# Patient Record
Sex: FEMALE | Race: WHITE | NOT HISPANIC OR LATINO | Employment: UNEMPLOYED | URBAN - METROPOLITAN AREA
[De-identification: names, ages, dates, MRNs, and addresses within clinical notes are randomized per-mention and may not be internally consistent; named-entity substitution may affect disease eponyms.]

---

## 2023-12-11 ENCOUNTER — MEDICAL CORRESPONDENCE (OUTPATIENT)
Dept: HEALTH INFORMATION MANAGEMENT | Facility: CLINIC | Age: 23
End: 2023-12-11

## 2024-02-29 ENCOUNTER — TRANSCRIBE ORDERS (OUTPATIENT)
Dept: OTHER | Age: 24
End: 2024-02-29

## 2024-02-29 DIAGNOSIS — J30.9 ALLERGIC RHINITIS, UNSPECIFIED SEASONALITY, UNSPECIFIED TRIGGER: Primary | ICD-10-CM

## 2024-03-01 ENCOUNTER — TELEPHONE (OUTPATIENT)
Dept: ALLERGY | Facility: CLINIC | Age: 24
End: 2024-03-01
Payer: COMMERCIAL

## 2024-03-01 NOTE — TELEPHONE ENCOUNTER
[facility] Call Center    Phone Message    May a detailed message be left on voicemail: no     Reason for Call: Appointment Intake    Referring Provider Name: Adriana Cooper APRN, CNP @ [facility]  Diagnosis and/or Symptoms: Allergic rhinitis, unspecified seasonality, unspecified trigger   Clinical Question: Several allergies. Unable to smell x 5 years. Please complete allergy testing and see if there are any options to improve sense of smell.   Scheduled: 7/9/2024 with Abner Bunch MD    Unable to stop taking allergy medications 7 days prior to the appointment. Routing to clinic per protocols.    Action Taken: Message routed to:  Other: [facility] Allergy    Travel Screening: Not Applicable

## 2024-03-08 NOTE — TELEPHONE ENCOUNTER
Called  services and left a voicemail. Patient does not have to stop taking antihistamines prior to her visit.  If allergy testing is needed, blood tests can be preformed instead of the skin testing.     Quentin Oleary MA  3/8/2024 12:41 PM

## 2024-03-09 ENCOUNTER — MEDICAL CORRESPONDENCE (OUTPATIENT)
Dept: HEALTH INFORMATION MANAGEMENT | Facility: CLINIC | Age: 24
End: 2024-03-09
Payer: COMMERCIAL

## 2024-03-12 ENCOUNTER — TRANSCRIBE ORDERS (OUTPATIENT)
Dept: OTHER | Age: 24
End: 2024-03-12

## 2024-03-12 DIAGNOSIS — R43.0 ANOSMIA: Primary | ICD-10-CM

## 2024-03-19 NOTE — TELEPHONE ENCOUNTER
"FUTURE VISIT INFORMATION      FUTURE VISIT INFORMATION:  Date: 6/11/24  Time: 11 AM  Location: [facility]  REFERRAL INFORMATION:  Referring provider:  Ben Arellano MD  Referring providers clinic:  [facility]  Reason for visit/diagnosis:  Anosmia [R43.0]  REF BY Ben Arellano MD - [facility]  RECS IN EPIC  CONF LOC  SCHED W/PT AND      RECORDS REQUESTED FROM      Clinic name Comments Records Status Imaging Status   [facility] 3/9/24 referral/ note- Ben Arellano MD  12/11/23 note- Adriana Cooper, NAZ,CNP  CE            \"Please notify/message CSS if patient completed outside imaging prior to scheduled appointment and/or any outside records that might have been missed at pre visit -Thank you\"  "

## 2024-06-10 PROBLEM — R43.8 REDUCED SENSE OF SMELL: Status: ACTIVE | Noted: 2023-07-10

## 2024-06-11 ENCOUNTER — OFFICE VISIT (OUTPATIENT)
Dept: OTOLARYNGOLOGY | Facility: CLINIC | Age: 24
End: 2024-06-11
Attending: INTERNAL MEDICINE
Payer: COMMERCIAL

## 2024-06-11 ENCOUNTER — PRE VISIT (OUTPATIENT)
Dept: OTOLARYNGOLOGY | Facility: CLINIC | Age: 24
End: 2024-06-11

## 2024-06-11 VITALS — SYSTOLIC BLOOD PRESSURE: 131 MMHG | HEART RATE: 102 BPM | OXYGEN SATURATION: 96 % | DIASTOLIC BLOOD PRESSURE: 88 MMHG

## 2024-06-11 DIAGNOSIS — J32.4 CHRONIC PANSINUSITIS: ICD-10-CM

## 2024-06-11 DIAGNOSIS — J33.9 NASAL POLYP: ICD-10-CM

## 2024-06-11 DIAGNOSIS — R43.0 ANOSMIA: Primary | ICD-10-CM

## 2024-06-11 PROCEDURE — 99204 OFFICE O/P NEW MOD 45 MIN: CPT | Mod: 25 | Performed by: STUDENT IN AN ORGANIZED HEALTH CARE EDUCATION/TRAINING PROGRAM

## 2024-06-11 PROCEDURE — 31231 NASAL ENDOSCOPY DX: CPT | Performed by: STUDENT IN AN ORGANIZED HEALTH CARE EDUCATION/TRAINING PROGRAM

## 2024-06-11 RX ORDER — PREDNISONE 10 MG/1
TABLET ORAL
Qty: 30 TABLET | Refills: 0 | Status: SHIPPED | OUTPATIENT
Start: 2024-06-11 | End: 2024-07-18

## 2024-06-11 RX ORDER — FLUTICASONE PROPIONATE 50 MCG
2 SPRAY, SUSPENSION (ML) NASAL 2 TIMES DAILY
Qty: 11.1 ML | Refills: 3 | Status: SHIPPED | OUTPATIENT
Start: 2024-06-11

## 2024-06-11 NOTE — LETTER
6/11/2024       RE: Yanely Fiore  [address] [address]     Dear Colleague,    Thank you for referring your patient, Yanely Fiore, to the [facility] [place] at [facility]. Please see a copy of my visit note below.      [facility] - Rhinology  New Patient Visit      Encounter date:   June 11, 2024    Referring Provider:  Ben Arellano MD  [facility]  [address] [place] [address]    Assessment, Decision Making, and Plan:  (R43.0) Anosmia  (primary encounter diagnosis)  (J32.4) Chronic pansinusitis  (J33.9) Nasal polyp  Comment:   --we discussed that chronic sinusitis (CRS) is a chronic inflammatory condition that affects the nose and sinuses   --we discussed that we manage CRS using a combination of systemic antibiotic and steroid therapy, topical steroid and antibiotics, endoscopic sinus surgery, and occasionally systemic biologic therapy  --we discussed that the goal of therapy is for symptom control  --grade 1-2 polyps bilaterally, contained to MM  Plan: IMAGESTREAM RECORDING ORDER, predniSONE         (DELTASONE) 10 MG tablet, fluticasone (FLONASE)        50 MCG/ACT nasal spray  --pred taper and flonase  --Follow up 1 month  --Possible CT at that point and symptom check    Chief Complaint: smell loss    History of Present Illness:   Yanely Fiore is a 23 year old female who presents for consultation regarding smell loss.    Smell loss for 5 years  UPSIT today 7/40    At the time of onset, had runny nose, itching, sneezing, difficulty breathing through the nose  Taste is preserved    Still has congestion, runny nose, sneezing symptoms  Year round  Nasal sprays helped  No nasal surgery      Review of systems: A 14-point review of systems has been conducted and was negative for any notable symptoms, except as dictated in the history of present illness.     Medical  History:  No past medical history on file.     Surgical History:   No past surgical history on file.     Family History:  No family history on file.     Social History:   Social History     Socioeconomic History    Marital status: Single     Social Determinants of Health     Financial Resource Strain: Not on File (2023)    Received from [facility]     Financial Resource Strain     Financial Resource Strain: 0   Food Insecurity: Not on File (2023)    Received from [facility]     Food Insecurity     Food: 0   Transportation Needs: Not on File (2023)    Received from [facility]     Transportation Needs     Transportation: 0   Physical Activity: Not on File (2023)    Received from [facility]     Physical Activity     Physical Activity: 0   Stress: Not on File (2023)    Received from [facility]     Stress     Stress: 0   Social Connections: Not on File (2023)    Received from [facility]     Social Connections     Social Connections and Isolation: 0   Housing Stability: Not on File (2023)    Received from [facility]     Housing Stability     Housin        Physical Exam:  Vital signs: /88 (BP Location: Left arm, Patient Position: Sitting, Cuff Size: Adult Regular)   Pulse 102   SpO2 96%    General Appearance: No acute distress, appropriate demeanor, conversant  Eyes: moist conjunctivae; EOMI; pupils symmetric; visual acuity grossly intact; no proptosis  Head: normocephalic; overall symmetric appearance without deformity  Face: overall symmetric without deformity  Ears: Normal appearance of external ear; external meatus normal in appearance; TMs intact without perforation bilaterally;   Nose: No external deformity; septum (midline) ; inferior turbinates (pale blue)   Oral Cavity/oropharynx: Normal appearance of mucosa; tongue midline; no mass or lesions; oropharynx without obvious mucosal abnormality  Neck: no palpable lymphadenopathy; thyroid  without palpable nodules  Lungs: symmetric chest rise; no wheezing  CV: Good distal perfusion; normal hear rate  Extremities: No deformity  Neurologic Exam: Cranial nerves II-XII are grossly intact; no focal deficit    Procedure Note  Procedure performed: Rigid nasal endoscopy  Indication: To evaluate for sinonasal pathology not visualized on routine anterior rhinoscopy  Anesthesia: 4% topical lidocaine with 0.05% oxymetazoline  Description of procedure: A 30 degree, 3 mm rigid endoscope was inserted into bilateral nasal cavities and the nasal valves, nasal cavity, middle meatus, sphenoethmoid recess, and nasopharynx were thoroughly evaluated for evidence of obstruction, edema, purulence, polyps and/or mass/lesion.     Findings:    Bilateral polyps contained to MM  NP clear, no significant septal deviation    The patient tolerated the procedure well without complication.       Steffen Alonso MD    [facility]  Rhinology  Endoscopic Skull Base Surgery  [facility]  Department of Otolaryngology - Head & Neck Surgery

## 2024-06-11 NOTE — PATIENT INSTRUCTIONS
You were seen in the ENT Clinic today by Dr. Alonso. If you have any questions or concerns after your appointment, please contact us (see below)    The following has been recommended for you based upon your appointment today:  Flonase and Prednisone have been sent to your pharmacy     Please return to clinic in 1 month for follow up with Dr. Alonso     How to Contact Us:  Send a [employer]om[employer] message to your provider. Our team will respond to you via Neom[employer]. Occasionally, we will need to call you to get further information.  For urgent matters (Monday-Friday), call the ENT Clinic: [phone] and speak with a call center team member - they will route your call appropriately.   If you'd like to speak directly with a nurse, please find our contact information below. We do our best to check voicemail frequently throughout the day, and will work to call you back within 1-2 days. For urgent matters, please use the general clinic phone numbers listed above.    Kamille PRUITT RN, BSN   RN Care Coordinator, ENT Clinic  [facility]  Direct: [phone]  Shannan ANDERSON LPN  Direct: [phone]

## 2024-06-11 NOTE — PROGRESS NOTES
[facility] - Rhinology  New Patient Visit      Encounter date:   June 11, 2024    Referring Provider:  Ben Arellano MD  [facility]  [address] [place] [address]    Assessment, Decision Making, and Plan:  (R43.0) Anosmia  (primary encounter diagnosis)  (J32.4) Chronic pansinusitis  (J33.9) Nasal polyp  Comment:   --we discussed that chronic sinusitis (CRS) is a chronic inflammatory condition that affects the nose and sinuses   --we discussed that we manage CRS using a combination of systemic antibiotic and steroid therapy, topical steroid and antibiotics, endoscopic sinus surgery, and occasionally systemic biologic therapy  --we discussed that the goal of therapy is for symptom control  --grade 1-2 polyps bilaterally, contained to MM  Plan: IMAGESTREAM RECORDING ORDER, predniSONE         (DELTASONE) 10 MG tablet, fluticasone (FLONASE)        50 MCG/ACT nasal spray  --pred taper and flonase  --Follow up 1 month  --Possible CT at that point and symptom check    Chief Complaint: smell loss    History of Present Illness:   Yanely Fiore is a 23 year old female who presents for consultation regarding smell loss.    Smell loss for 5 years  UPSIT today 7/40    At the time of onset, had runny nose, itching, sneezing, difficulty breathing through the nose  Taste is preserved    Still has congestion, runny nose, sneezing symptoms  Year round  Nasal sprays helped  No nasal surgery      Review of systems: A 14-point review of systems has been conducted and was negative for any notable symptoms, except as dictated in the history of present illness.     Medical History:  No past medical history on file.     Surgical History:   No past surgical history on file.     Family History:  No family history on file.     Social History:   Social History     Socioeconomic History    Marital status: Single     Social Determinants of Health     Financial Resource Strain: Not on File (11/28/2023)     Received from [facility]     Financial Resource Strain     Financial Resource Strain: 0   Food Insecurity: Not on File (2023)    Received from [facility]     Food Insecurity     Food: 0   Transportation Needs: Not on File (2023)    Received from [facility]     Transportation Needs     Transportation: 0   Physical Activity: Not on File (2023)    Received from [facility]     Physical Activity     Physical Activity: 0   Stress: Not on File (2023)    Received from [facility]     Stress     Stress: 0   Social Connections: Not on File (2023)    Received from [facility]     Social Connections     Social Connections and Isolation: 0   Housing Stability: Not on File (2023)    Received from [facility]     Housing Stability     Housin        Physical Exam:  Vital signs: /88 (BP Location: Left arm, Patient Position: Sitting, Cuff Size: Adult Regular)   Pulse 102   SpO2 96%    General Appearance: No acute distress, appropriate demeanor, conversant  Eyes: moist conjunctivae; EOMI; pupils symmetric; visual acuity grossly intact; no proptosis  Head: normocephalic; overall symmetric appearance without deformity  Face: overall symmetric without deformity  Ears: Normal appearance of external ear; external meatus normal in appearance; TMs intact without perforation bilaterally;   Nose: No external deformity; septum (midline) ; inferior turbinates (pale blue)   Oral Cavity/oropharynx: Normal appearance of mucosa; tongue midline; no mass or lesions; oropharynx without obvious mucosal abnormality  Neck: no palpable lymphadenopathy; thyroid without palpable nodules  Lungs: symmetric chest rise; no wheezing  CV: Good distal perfusion; normal hear rate  Extremities: No deformity  Neurologic Exam: Cranial nerves II-XII are grossly intact; no focal deficit    Procedure Note  Procedure performed: Rigid nasal endoscopy  Indication: To evaluate for sinonasal pathology not  visualized on routine anterior rhinoscopy  Anesthesia: 4% topical lidocaine with 0.05% oxymetazoline  Description of procedure: A 30 degree, 3 mm rigid endoscope was inserted into bilateral nasal cavities and the nasal valves, nasal cavity, middle meatus, sphenoethmoid recess, and nasopharynx were thoroughly evaluated for evidence of obstruction, edema, purulence, polyps and/or mass/lesion.     Findings:    Bilateral polyps contained to MM  NP clear, no significant septal deviation    The patient tolerated the procedure well without complication.       Steffen Alonso MD    [facility]  Rhinology  Endoscopic Skull Base Surgery  [facility]  Department of Otolaryngology - Head & Neck Surgery

## 2024-07-09 ENCOUNTER — OFFICE VISIT (OUTPATIENT)
Dept: ALLERGY | Facility: CLINIC | Age: 24
End: 2024-07-09
Payer: COMMERCIAL

## 2024-07-09 VITALS
SYSTOLIC BLOOD PRESSURE: 105 MMHG | WEIGHT: 106.8 LBS | DIASTOLIC BLOOD PRESSURE: 73 MMHG | OXYGEN SATURATION: 97 % | HEART RATE: 91 BPM

## 2024-07-09 DIAGNOSIS — R09.89 RUNNY NOSE: ICD-10-CM

## 2024-07-09 DIAGNOSIS — R43.8 REDUCED SENSE OF SMELL: Primary | ICD-10-CM

## 2024-07-09 DIAGNOSIS — R09.81 NASAL CONGESTION: ICD-10-CM

## 2024-07-09 DIAGNOSIS — J33.9 NASAL POLYP: ICD-10-CM

## 2024-07-09 DIAGNOSIS — T78.1XXA ADVERSE FOOD REACTION, INITIAL ENCOUNTER: ICD-10-CM

## 2024-07-09 DIAGNOSIS — J30.9 ALLERGIC RHINITIS, UNSPECIFIED SEASONALITY, UNSPECIFIED TRIGGER: ICD-10-CM

## 2024-07-09 PROCEDURE — 99204 OFFICE O/P NEW MOD 45 MIN: CPT | Performed by: INTERNAL MEDICINE

## 2024-07-09 NOTE — PATIENT INSTRUCTIONS
Allergy Staff Appt Hours Shot Hours Location       Physician   Abner Bunch MD      Support Staff   JUNE Greer RN, MA Emily J., MA      Mondays Tuesdays Thursdays and Fridays:      Christine 7-5      Wednesdays         Close                Mondays, Tuesdays and Fridays:  7:20 - 3:40              [employer]  [address]  [place] [address]  Allergy appointment  line: [phone]    Pulmonary Function Scheduling:  [place]: [phone]           Questions about cost of your care  For questions about your cost of your visit, procedure, lab or imaging contact: [employer] Line [phone] or visit:  [email or web address]    Prescription Assistance  If you need assistance with your prescriptions (cost, coverage, etc) please contact: [employer] Prescription Assistance Program [phone]    Important Scheduling Information  All visits for food challenges, medication/drug allergy testing, and drug challenges MUST be scheduled through the allergy clinic nurse. Please contact them via [employer] or by calling the clinic at [phone] and asking to speak with an allergy nurse. They will provide additional information and instructions for the appointment. Discontinue oral antihistamines 7 days prior to the appointment. Discontinue nasal and ocular antihistamines 1 day prior to the appointment.    Appointments for skin testing: Appointment will last approximately 45 minutes.  Please call the appointment line for your clinic to schedule.  Discontinue oral antihistamines 7 days prior to the appointment.  Discontinue nasal and ocular antihistamines 1 days prior to appointment.    Thank you for trusting us with your care. Please feel free to contact us with any questions or concerns you may have.

## 2024-07-09 NOTE — LETTER
7/9/2024      Yanely Fiore  [address] [address]      Dear Colleague,    Thank you for referring your patient, Yanely Fiore, to the [facility] SPECIALTY [facility]. Please see a copy of my visit note below.    Yanely Fiore was seen in the Allergy Clinic at [facility].    Yanely Fiore is a 24 year old female being seen today at the request of Adriana Cooper APRN [facility] in consultation for allergy concerns as well as decreased sense of smell.    She lost her sense of smell 5 to 10 years ago.  She saw an ENT provider on 6/11 and did have evidence of nasal polyps.  Prednisone was prescribed and Flonase was recommended.  The prednisone did help her sense of smell while she was taking it but then after stopping steroids her sense of smell has now worsened again.  In addition she has rhinorrhea, itchy nose and throat discomfort.  She also has some nasal congestion and difficulty breathing through her nose.  Symptoms are worse in the summer but she has symptoms year-round.    In addition she talks about nasal itching and lip itching with dry fruits and potentially some nuts.  However she eats all forms of nuts without any hives or significant reactions.    Melon causes itching of her mouth also.    A  was used for obtaining all medical information.      History reviewed. No pertinent past medical history.  History reviewed. No pertinent family history.  No past surgical history on file.    ENVIRONMENTAL HISTORY:   Pets inside the house include None.  Do you smoke cigarettes or other recreational drugs? No There is/are 0 smokers living in the house. The house does not have a damp basement.     SOCIAL HISTORY:   Yanely is unemployed. She lives with her family.      Review of Systems      Current Outpatient Medications:      fluticasone (FLONASE) 50 MCG/ACT nasal spray, Spray 2 sprays into both nostrils 2 times daily, Disp: 11.1 mL, Rfl:  3     predniSONE (DELTASONE) 10 MG tablet, Take 4 pills once daily for 3 days; then take 3 pills daily for 3 days; then take 2 pills daily for 3 days; then take 1 pill daily for 3 days. (Patient not taking: Reported on 7/9/2024), Disp: 30 tablet, Rfl: 0  Allergies   Allergen Reactions     Ibuprofen Itching, Other (See Comments) and Swelling     Fever/cold sores     Nuts      Other Food Allergy      Perfume          EXAM:   /73   Pulse 91   Wt 48.4 kg (106 lb 12.8 oz)   SpO2 97%     Physical Exam    Constitutional:       General: She is not in acute distress.     Appearance: Normal appearance. She is not ill-appearing.   HENT:      Head: Normocephalic and atraumatic.      Nose: Nasal turbinate hypertrophy on the right.     Mouth/Throat:      Mouth: Mucous membranes are moist.      Pharynx: Oropharynx is clear. No posterior oropharyngeal erythema.   Eyes:      General:         Right eye: No discharge.         Left eye: No discharge.   Cardiovascular:      Rate and Rhythm: Normal rate and regular rhythm.      Heart sounds: Normal heart sounds.   Pulmonary:      Effort: Pulmonary effort is normal.      Breath sounds: Normal breath sounds. No wheezing or rhonchi.   Skin:     General: Skin is warm.      Findings: No erythema or rash.   Neurological:      General: No focal deficit present.      Mental Status: She is alert. Mental status is at baseline.   Psychiatric:         Mood and Affect: Mood normal.         Behavior: Behavior normal.          ASSESSMENT/PLAN:  Yanely Fiore is a 24 year old female seen today for increased sense of smell as well as rhinorrhea, itchy nose and nasal congestion.  She also has a history of nasal polyps and did see ENT.  She also had food concerns with nuts, melons and dried fruit.  This sounds like oral allergy syndrome and we will evaluate with blood testing.    She will be contacted via phone with the results once they are are available.  She will continue with the Flonase.   She will continue evaluation with ENT as the loss of smell is one of her biggest complaints and she does have nasal polyps which may be contributing.  May consider allergy immunotherapy if she has significant allergies and is not responding to medications.  Based on her history I do not feel that she needs EpiPen.    Follow-up to be determined      Thank you for allowing me to participate in the care of Yanely Fiore.      I spent 50 minutes on the date of the encounter doing chart review, history and exam, documentation and further coordination as noted above exclusive of separately reported interpretations    Abner Bunch MD  Allergy/Immunology  [facility]      Again, thank you for allowing me to participate in the care of your patient.        Sincerely,        Abner Bunch MD

## 2024-07-09 NOTE — PROGRESS NOTES
Yanely Fiore was seen in the Allergy Clinic at [facility].    Yanely Fiore is a 24 year old female being seen today at the request of Adriana Cooper APRN [facility] in consultation for allergy concerns as well as decreased sense of smell.    She lost her sense of smell 5 to 10 years ago.  She saw an ENT provider on 6/11 and did have evidence of nasal polyps.  Prednisone was prescribed and Flonase was recommended.  The prednisone did help her sense of smell while she was taking it but then after stopping steroids her sense of smell has now worsened again.  In addition she has rhinorrhea, itchy nose and throat discomfort.  She also has some nasal congestion and difficulty breathing through her nose.  Symptoms are worse in the summer but she has symptoms year-round.    In addition she talks about nasal itching and lip itching with dry fruits and potentially some nuts.  However she eats all forms of nuts without any hives or significant reactions.    Melon causes itching of her mouth also.    A  was used for obtaining all medical information.      History reviewed. No pertinent past medical history.  History reviewed. No pertinent family history.  No past surgical history on file.    ENVIRONMENTAL HISTORY:   Pets inside the house include None.  Do you smoke cigarettes or other recreational drugs? No There is/are 0 smokers living in the house. The house does not have a damp basement.     SOCIAL HISTORY:   Yanely is unemployed. She lives with her family.      Review of Systems      Current Outpatient Medications:     fluticasone (FLONASE) 50 MCG/ACT nasal spray, Spray 2 sprays into both nostrils 2 times daily, Disp: 11.1 mL, Rfl: 3    predniSONE (DELTASONE) 10 MG tablet, Take 4 pills once daily for 3 days; then take 3 pills daily for 3 days; then take 2 pills daily for 3 days; then take 1 pill daily for 3 days. (Patient not taking: Reported on 7/9/2024), Disp: 30  tablet, Rfl: 0  Allergies   Allergen Reactions    Ibuprofen Itching, Other (See Comments) and Swelling     Fever/cold sores    Nuts     Other Food Allergy     Perfume          EXAM:   /73   Pulse 91   Wt 48.4 kg (106 lb 12.8 oz)   SpO2 97%     Physical Exam    Constitutional:       General: She is not in acute distress.     Appearance: Normal appearance. She is not ill-appearing.   HENT:      Head: Normocephalic and atraumatic.      Nose: Nasal turbinate hypertrophy on the right.     Mouth/Throat:      Mouth: Mucous membranes are moist.      Pharynx: Oropharynx is clear. No posterior oropharyngeal erythema.   Eyes:      General:         Right eye: No discharge.         Left eye: No discharge.   Cardiovascular:      Rate and Rhythm: Normal rate and regular rhythm.      Heart sounds: Normal heart sounds.   Pulmonary:      Effort: Pulmonary effort is normal.      Breath sounds: Normal breath sounds. No wheezing or rhonchi.   Skin:     General: Skin is warm.      Findings: No erythema or rash.   Neurological:      General: No focal deficit present.      Mental Status: She is alert. Mental status is at baseline.   Psychiatric:         Mood and Affect: Mood normal.         Behavior: Behavior normal.          ASSESSMENT/PLAN:  Yanely Fiore is a 24 year old female seen today for increased sense of smell as well as rhinorrhea, itchy nose and nasal congestion.  She also has a history of nasal polyps and did see ENT.  She also had food concerns with nuts, melons and dried fruit.  This sounds like oral allergy syndrome and we will evaluate with blood testing.    She will be contacted via phone with the results once they are are available.  She will continue with the Flonase.  She will continue evaluation with ENT as the loss of smell is one of her biggest complaints and she does have nasal polyps which may be contributing.  May consider allergy immunotherapy if she has significant allergies and is not responding to  medications.  Based on her history I do not feel that she needs EpiPen.    Follow-up to be determined      Thank you for allowing me to participate in the care of Yanely Fiore.      I spent 50 minutes on the date of the encounter doing chart review, history and exam, documentation and further coordination as noted above exclusive of separately reported interpretations    Abner Bunch MD  Allergy/Immunology  [facility]

## 2024-07-13 ENCOUNTER — LAB (OUTPATIENT)
Dept: LAB | Facility: CLINIC | Age: 24
End: 2024-07-13
Payer: COMMERCIAL

## 2024-07-13 DIAGNOSIS — R43.8 REDUCED SENSE OF SMELL: ICD-10-CM

## 2024-07-13 DIAGNOSIS — R09.89 RUNNY NOSE: ICD-10-CM

## 2024-07-13 DIAGNOSIS — J33.9 NASAL POLYP: ICD-10-CM

## 2024-07-13 DIAGNOSIS — R09.81 NASAL CONGESTION: ICD-10-CM

## 2024-07-13 PROCEDURE — 36415 COLL VENOUS BLD VENIPUNCTURE: CPT

## 2024-07-13 PROCEDURE — 86003 ALLG SPEC IGE CRUDE XTRC EA: CPT

## 2024-07-16 ENCOUNTER — TELEPHONE (OUTPATIENT)
Dept: ALLERGY | Facility: CLINIC | Age: 24
End: 2024-07-16
Payer: COMMERCIAL

## 2024-07-16 LAB
A ALTERNATA IGE QN: <0.1 KU(A)/L
A FUMIGATUS IGE QN: <0.1 KU(A)/L
C HERBARUM IGE QN: <0.1 KU(A)/L
CALIF WALNUT POLN IGE QN: 0.29 KU(A)/L
CAT DANDER IGG QN: <0.1 KU(A)/L
CEDAR IGE QN: <0.1 KU(A)/L
COMMON RAGWEED IGE QN: <0.1 KU(A)/L
COTTONWOOD IGE QN: <0.1 KU(A)/L
D FARINAE IGE QN: <0.1 KU(A)/L
D PTERONYSS IGE QN: <0.1 KU(A)/L
DOG DANDER+EPITH IGE QN: <0.1 KU(A)/L
E PURPURASCENS IGE QN: <0.1 KU(A)/L
EAST WHITE PINE IGE QN: <0.1 KU(A)/L
ENGL PLANTAIN IGE QN: 0.1 KU(A)/L
FIREBUSH IGE QN: 0.82 KU(A)/L
GIANT RAGWEED IGE QN: <0.1 KU(A)/L
GOOSEFOOT IGE QN: 0.52 KU(A)/L
JOHNSON GRASS IGE QN: <0.1 KU(A)/L
MAPLE IGE QN: 0.17 KU(A)/L
MUGWORT IGE QN: 0.15 KU(A)/L
NETTLE IGE QN: <0.1 KU(A)/L
P NOTATUM IGE QN: <0.1 KU(A)/L
RED MULBERRY IGE QN: <0.1 KU(A)/L
SALTWORT IGE QN: 3.21 KU(A)/L
SHEEP SORREL IGE QN: 0.18 KU(A)/L
SILVER BIRCH IGE QN: 0.14 KU(A)/L
TIMOTHY IGE QN: 1.31 KU(A)/L
WHITE ASH IGE QN: 0.41 KU(A)/L
WHITE ELM IGE QN: 0.12 KU(A)/L
WHITE MULBERRY IGE QN: <0.1 KU(A)/L
WHITE OAK IGE QN: <0.1 KU(A)/L
WORMWOOD IGE QN: 0.15 KU(A)/L

## 2024-07-16 NOTE — TELEPHONE ENCOUNTER
Writer called and spoke with patient. Relayed that results are positive to trees, grass, weeds.  Patient does have results to birch and grass which can contribute to the oral allergy syndrome that was discussed at the appointment. Writer schedule patient for a follow up visit per Dr. Bunch's request.         Joselyn Wen RN

## 2024-07-18 ENCOUNTER — OFFICE VISIT (OUTPATIENT)
Dept: OPHTHALMOLOGY | Facility: CLINIC | Age: 24
End: 2024-07-18
Attending: OPHTHALMOLOGY
Payer: COMMERCIAL

## 2024-07-18 DIAGNOSIS — H04.123 BILATERAL DRY EYES: ICD-10-CM

## 2024-07-18 DIAGNOSIS — Z13.5 SCREENING FOR EYE CONDITION: Primary | ICD-10-CM

## 2024-07-18 DIAGNOSIS — H52.7 REFRACTIVE ERROR: ICD-10-CM

## 2024-07-18 PROCEDURE — 92004 COMPRE OPH EXAM NEW PT 1/>: CPT | Performed by: OPHTHALMOLOGY

## 2024-07-18 PROCEDURE — G0463 HOSPITAL OUTPT CLINIC VISIT: HCPCS | Performed by: OPHTHALMOLOGY

## 2024-07-18 PROCEDURE — 92015 DETERMINE REFRACTIVE STATE: CPT

## 2024-07-18 ASSESSMENT — REFRACTION_MANIFEST
OS_SPHERE: -2.00
OD_CYLINDER: +1.00
OS_AXIS: 163
OD_AXIS: 012
OD_SPHERE: -2.00
OS_CYLINDER: +1.00

## 2024-07-18 ASSESSMENT — VISUAL ACUITY
OS_PH_SC: 20/25
OD_PH_SC: 20/20
OS_SC+: -2
OD_SC+: -2
OD_SC: 20/30
OS_SC: J1+
METHOD: SNELLEN - LINEAR
OD_SC: J1+
OS_SC: 20/40
OS_PH_SC+: -1
OD_PH_SC+: -2

## 2024-07-18 ASSESSMENT — CUP TO DISC RATIO
OS_RATIO: 0.1
OD_RATIO: 0.1

## 2024-07-18 ASSESSMENT — EXTERNAL EXAM - LEFT EYE: OS_EXAM: NORMAL

## 2024-07-18 ASSESSMENT — EXTERNAL EXAM - RIGHT EYE: OD_EXAM: NORMAL

## 2024-07-18 ASSESSMENT — TONOMETRY
IOP_METHOD: TONOPEN
OD_IOP_MMHG: 17
OS_IOP_MMHG: 15

## 2024-07-18 ASSESSMENT — CONF VISUAL FIELD
OD_INFERIOR_TEMPORAL_RESTRICTION: 0
OS_SUPERIOR_TEMPORAL_RESTRICTION: 0
OS_NORMAL: 1
OD_SUPERIOR_TEMPORAL_RESTRICTION: 0
OS_INFERIOR_NASAL_RESTRICTION: 0
METHOD: COUNTING FINGERS
OS_SUPERIOR_NASAL_RESTRICTION: 0
OD_NORMAL: 1
OD_SUPERIOR_NASAL_RESTRICTION: 0
OD_INFERIOR_NASAL_RESTRICTION: 0
OS_INFERIOR_TEMPORAL_RESTRICTION: 0

## 2024-07-18 ASSESSMENT — SLIT LAMP EXAM - LIDS
COMMENTS: NORMAL
COMMENTS: NORMAL

## 2024-07-18 NOTE — NURSING NOTE
Chief Complaints and History of Present Illnesses   Patient presents with    Annual Eye Exam     Chief Complaint(s) and History of Present Illness(es)       Annual Eye Exam              Laterality: both eyes    Onset: gradual    Associated symptoms: dryness (morning), eye pain, tearing, photophobia and itching.  Negative for flashes and floaters    Pain scale: 5/10              Comments    Yanely is here new to clinic, and self referred for a complete eye exam. She says she feels pain over the past year especially while watching TV or using her computer.   She does not wear glasses or contacts    Robbie Serra COT 7:46 AM July 18, 2024

## 2024-07-18 NOTE — PROGRESS NOTES
HPI       Annual Eye Exam    In both eyes.  Onset was gradual.  Associated symptoms include dryness (morning), eye pain, tearing, photophobia and itching.  Negative for flashes and floaters.  Pain was noted as 5/10.             Comments    Yanely is here new to clinic, and self referred for a complete eye exam. She says she feels pain over the past year especially while watching TV or using her computer.   She does not wear glasses or contacts    Robbie Serra COT 7:46 AM July 18, 2024             Last edited by Robbie Serra on 7/18/2024  7:46 AM.          Review of systems for the eyes was negative other than the pertinent positives/negatives listed in the HPI.      Assessment & Plan      Yanely Fiore is a 24 year old female with the following diagnoses:   1. Screening for eye condition    2. Refractive error       New patient here for dilated fundus exam   History obtained via interpretor   New intermittent eye pain c TV and computer  No treatments tried    Discussed contributing factors  Recommend artifical tears frequently     Healthy dilated fundus exam both eyes   Refractive options reviewed  Refraction given   Return precautions reviewed         Patient disposition:   Return in about 2 years (around 7/18/2026) for [facility] Optometry clinic.           Attending Physician Attestation:  Complete documentation of historical and exam elements from today's encounter can be found in the full encounter summary report (not reduplicated in this progress note).  I personally obtained the chief complaint(s) and history of present illness.  I confirmed and edited as necessary the review of systems, past medical/surgical history, family history, social history, and examination findings as documented by others; and I examined the patient myself.  I personally reviewed the relevant tests, images, and reports as documented above.  I formulated and edited as necessary the assessment and plan and discussed the findings and  management plan with the patient and family. . - Mukesh Nunes MD

## 2024-07-29 PROBLEM — R43.0 ANOSMIA: Status: ACTIVE | Noted: 2024-07-29

## 2024-07-29 PROBLEM — J32.4 CHRONIC PANSINUSITIS: Status: ACTIVE | Noted: 2024-07-29

## 2024-07-29 PROBLEM — J33.9 NASAL POLYP: Status: ACTIVE | Noted: 2024-07-29

## 2024-07-30 ENCOUNTER — OFFICE VISIT (OUTPATIENT)
Dept: OTOLARYNGOLOGY | Facility: CLINIC | Age: 24
End: 2024-07-30
Payer: COMMERCIAL

## 2024-07-30 VITALS
HEART RATE: 98 BPM | WEIGHT: 107.2 LBS | HEIGHT: 61 IN | DIASTOLIC BLOOD PRESSURE: 79 MMHG | OXYGEN SATURATION: 99 % | SYSTOLIC BLOOD PRESSURE: 117 MMHG | BODY MASS INDEX: 20.24 KG/M2

## 2024-07-30 DIAGNOSIS — R43.0 ANOSMIA: Primary | ICD-10-CM

## 2024-07-30 DIAGNOSIS — J32.4 CHRONIC PANSINUSITIS: ICD-10-CM

## 2024-07-30 DIAGNOSIS — J33.9 NASAL POLYP: ICD-10-CM

## 2024-07-30 PROCEDURE — 99213 OFFICE O/P EST LOW 20 MIN: CPT | Mod: 25 | Performed by: STUDENT IN AN ORGANIZED HEALTH CARE EDUCATION/TRAINING PROGRAM

## 2024-07-30 PROCEDURE — 31231 NASAL ENDOSCOPY DX: CPT | Performed by: STUDENT IN AN ORGANIZED HEALTH CARE EDUCATION/TRAINING PROGRAM

## 2024-07-30 RX ORDER — FLUTICASONE PROPIONATE 50 MCG
2 SPRAY, SUSPENSION (ML) NASAL 2 TIMES DAILY
Qty: 11.1 ML | Refills: 3 | Status: SHIPPED | OUTPATIENT
Start: 2024-07-30

## 2024-07-30 NOTE — PATIENT INSTRUCTIONS
You were seen in the ENT Clinic today by Dr. Alonso. If you have any questions or concerns after your appointment, please contact us (see below)    The following has been recommended for you based upon your appointment today:  Flonase sent to the pharmacy  Send us a mychart if you would like to proceed with surgery. If wanting surgery we would get a CT scan.    Please return to clinic in 6 months    How to Contact Us:  Send a Here On [employer] message to your provider. Our team will respond to you via [employer]. Occasionally, we will need to call you to get further information.  For urgent matters (Monday-Friday), call the ENT Clinic: [phone] and speak with a call center team member - they will route your call appropriately.   If you'd like to speak directly with a nurse, please find our contact information below. We do our best to check voicemail frequently throughout the day, and will work to call you back within 1-2 days. For urgent matters, please use the general clinic phone numbers listed above.    Kamille PRUITT RN, BSN   RN Care Coordinator, ENT Clinic  [facility]  Direct: [phone]  Shannan ANDERSON LPN  Direct: [phone]

## 2024-07-30 NOTE — NURSING NOTE
"Chief Complaint   Patient presents with    RECHECK   Blood pressure 117/79, pulse 98, height 1.55 m (5' 1.02\"), weight 48.6 kg (107 lb 3.2 oz), SpO2 99%. Jung Carcamo, EMT    "

## 2024-07-30 NOTE — PROGRESS NOTES
[facility] - Rhinology  Established Patient Visit      Encounter date:   7/30/2024    Assessment, Decision Making, and Plan:  (R43.0) Anosmia  (primary encounter diagnosis)  (J32.4) Chronic pansinusitis  (J33.9) Nasal polyp  Comment:   --strong response to oral steroid and flonase, since stopping though most of her symptoms have returned  --we discussed that we have a few ways to proceed - could continue topical steroid alone if her symptom burden is acceptable vs. Surgery to clear disease and optimize delivery of topicals over the long term  --if we proceed with surgery would get a CT for image guidance  --we did have a detailed discussion of surgical expectations, post operative recovery, indications for surgery, and operative risks - she understood but needed more time to consider  Plan: IMAGESTREAM RECORDING ORDER, predniSONE         (DELTASONE) 10 MG tablet, fluticasone (FLONASE)        50 MCG/ACT nasal spray  --continue flonase for now  --she will message in and let us know how she would like to proceed  --follow up 4 months regardless    Interval:  While on pred had return of sense of smell, minimal symptoms  Flonase helped, but not as potently as pred  Now off both, essentially return to baseline degree of symptoms    History of Present Illness (from initial consultation):   Yanely Fiore is a 23 year old female who presents for consultation regarding smell loss.    Smell loss for 5 years  UPSIT today 7/40    At the time of onset, had runny nose, itching, sneezing, difficulty breathing through the nose  Taste is preserved    Still has congestion, runny nose, sneezing symptoms  Year round  Nasal sprays helped  No nasal surgery      Review of systems: A 14-point review of systems has been conducted and was negative for any notable symptoms, except as dictated in the history of present illness.     Medical History:  No past medical history on file.     Surgical History:   No past surgical  "history on file.     Family History:  No family history on file.     Social History:   Social History     Socioeconomic History    Marital status: Single   Tobacco Use    Smoking status: Never    Smokeless tobacco: Never   Substance and Sexual Activity    Alcohol use: Never    Drug use: Never     Social Determinants of Health     Financial Resource Strain: Not on File (2023)    Received from [facility]    Financial Resource Strain     Financial Resource Strain: 0   Food Insecurity: Not on File (2023)    Received from [facility]    Food Insecurity     Food: 0   Transportation Needs: Not on File (2023)    Received from [facility]    Transportation Needs     Transportation: 0   Physical Activity: Not on File (2023)    Received from [facility]    Physical Activity     Physical Activity: 0   Stress: Not on File (2023)    Received from [facility]    Stress     Stress: 0   Social Connections: Not on File (2023)    Received from [facility]    Social Connections     Social Connections and Isolation: 0   Housing Stability: Not on File (2023)    Received from [facility]    Housing Stability     Housin        Physical Exam:  /79 (BP Location: Left arm, Patient Position: Sitting, Cuff Size: Adult Regular)   Pulse 98   Ht 1.55 m (5' 1.02\")   Wt 48.6 kg (107 lb 3.2 oz)   SpO2 99%   BMI 20.24 kg/m      General Appearance: No acute distress, appropriate demeanor, conversant  Eyes: moist conjunctivae; EOMI; pupils symmetric; visual acuity grossly intact; no proptosis  Head: normocephalic; overall symmetric appearance without deformity  Face: overall symmetric without deformity  Ears: Normal appearance of external ear; external meatus normal in appearance; TMs intact without perforation bilaterally;   Nose: No external deformity; septum (midline) ; inferior turbinates (pale blue)   Oral Cavity/oropharynx: Normal appearance of mucosa; tongue midline; no mass or " lesions; oropharynx without obvious mucosal abnormality  Neck: no palpable lymphadenopathy; thyroid without palpable nodules  Lungs: symmetric chest rise; no wheezing  CV: Good distal perfusion; normal hear rate  Extremities: No deformity  Neurologic Exam: Cranial nerves II-XII are grossly intact; no focal deficit    Procedure Note  Procedure performed: Rigid nasal endoscopy  Indication: To evaluate for sinonasal pathology not visualized on routine anterior rhinoscopy  Anesthesia: 4% topical lidocaine with 0.05% oxymetazoline  Description of procedure: A 30 degree, 3 mm rigid endoscope was inserted into bilateral nasal cavities and the nasal valves, nasal cavity, middle meatus, sphenoethmoid recess, and nasopharynx were thoroughly evaluated for evidence of obstruction, edema, purulence, polyps and/or mass/lesion.     Findings:    Bilateral grade 1 polyps contained to MM  NP clear, no significant septal deviation    The patient tolerated the procedure well without complication.       Steffen Alonso MD    [facility]  Rhinology  Endoscopic Skull Base Surgery  [facility]  Department of Otolaryngology - Head & Neck Surgery

## 2024-07-30 NOTE — LETTER
7/30/2024       RE: Yanely Fiore  [address] [address]     Dear Colleague,    Thank you for referring your patient, Yanely Fiore, to the [facility] EAR NOSE AND THROAT CLINIC [place] at [facility]. Please see a copy of my visit note below.      [facility] - Rhinology  Established Patient Visit      Encounter date:   7/30/2024    Assessment, Decision Making, and Plan:  (R43.0) Anosmia  (primary encounter diagnosis)  (J32.4) Chronic pansinusitis  (J33.9) Nasal polyp  Comment:   --strong response to oral steroid and flonase, since stopping though most of her symptoms have returned  --we discussed that we have a few ways to proceed - could continue topical steroid alone if her symptom burden is acceptable vs. Surgery to clear disease and optimize delivery of topicals over the long term  --if we proceed with surgery would get a CT for image guidance  --we did have a detailed discussion of surgical expectations, post operative recovery, indications for surgery, and operative risks - she understood but needed more time to consider  Plan: IMAGESTREAM RECORDING ORDER, predniSONE         (DELTASONE) 10 MG tablet, fluticasone (FLONASE)        50 MCG/ACT nasal spray  --continue flonase for now  --she will message in and let us know how she would like to proceed  --follow up 4 months regardless    Interval:  While on pred had return of sense of smell, minimal symptoms  Flonase helped, but not as potently as pred  Now off both, essentially return to baseline degree of symptoms    History of Present Illness (from initial consultation):   Yanely Fiore is a 23 year old female who presents for consultation regarding smell loss.    Smell loss for 5 years  UPSIT today 7/40    At the time of onset, had runny nose, itching, sneezing, difficulty breathing through the nose  Taste is preserved    Still has congestion, runny nose, sneezing  "symptoms  Year round  Nasal sprays helped  No nasal surgery      Review of systems: A 14-point review of systems has been conducted and was negative for any notable symptoms, except as dictated in the history of present illness.     Medical History:  No past medical history on file.     Surgical History:   No past surgical history on file.     Family History:  No family history on file.     Social History:   Social History     Socioeconomic History     Marital status: Single   Tobacco Use     Smoking status: Never     Smokeless tobacco: Never   Substance and Sexual Activity     Alcohol use: Never     Drug use: Never     Social Determinants of Health     Financial Resource Strain: Not on File (2023)    Received from [facility]    Financial Resource Strain      Financial Resource Strain: 0   Food Insecurity: Not on File (2023)    Received from [facility]    Food Insecurity      Food: 0   Transportation Needs: Not on File (2023)    Received from [facility]    Transportation Needs      Transportation: 0   Physical Activity: Not on File (2023)    Received from [facility]    Physical Activity      Physical Activity: 0   Stress: Not on File (2023)    Received from [facility]    Stress      Stress: 0   Social Connections: Not on File (2023)    Received from [facility]    Social Connections      Social Connections and Isolation: 0   Housing Stability: Not on File (2023)    Received from [facility]    Housing Stability      Housin        Physical Exam:  /79 (BP Location: Left arm, Patient Position: Sitting, Cuff Size: Adult Regular)   Pulse 98   Ht 1.55 m (5' 1.02\")   Wt 48.6 kg (107 lb 3.2 oz)   SpO2 99%   BMI 20.24 kg/m      General Appearance: No acute distress, appropriate demeanor, conversant  Eyes: moist conjunctivae; EOMI; pupils symmetric; visual acuity grossly intact; no proptosis  Head: normocephalic; overall symmetric appearance without " deformity  Face: overall symmetric without deformity  Ears: Normal appearance of external ear; external meatus normal in appearance; TMs intact without perforation bilaterally;   Nose: No external deformity; septum (midline) ; inferior turbinates (pale blue)   Oral Cavity/oropharynx: Normal appearance of mucosa; tongue midline; no mass or lesions; oropharynx without obvious mucosal abnormality  Neck: no palpable lymphadenopathy; thyroid without palpable nodules  Lungs: symmetric chest rise; no wheezing  CV: Good distal perfusion; normal hear rate  Extremities: No deformity  Neurologic Exam: Cranial nerves II-XII are grossly intact; no focal deficit    Procedure Note  Procedure performed: Rigid nasal endoscopy  Indication: To evaluate for sinonasal pathology not visualized on routine anterior rhinoscopy  Anesthesia: 4% topical lidocaine with 0.05% oxymetazoline  Description of procedure: A 30 degree, 3 mm rigid endoscope was inserted into bilateral nasal cavities and the nasal valves, nasal cavity, middle meatus, sphenoethmoid recess, and nasopharynx were thoroughly evaluated for evidence of obstruction, edema, purulence, polyps and/or mass/lesion.     Findings:    Bilateral grade 1 polyps contained to MM  NP clear, no significant septal deviation    The patient tolerated the procedure well without complication.       Steffen Alonso MD    [facility]  Rhinology  Endoscopic Skull Base Surgery  [facility]  Department of Otolaryngology - Head & Neck Surgery          Again, thank you for allowing me to participate in the care of your patient.      Sincerely,    Steffen Alonso MD

## 2024-07-31 ENCOUNTER — PREP FOR PROCEDURE (OUTPATIENT)
Dept: OTOLARYNGOLOGY | Facility: CLINIC | Age: 24
End: 2024-07-31
Payer: COMMERCIAL

## 2024-07-31 ENCOUNTER — PATIENT OUTREACH (OUTPATIENT)
Dept: OTOLARYNGOLOGY | Facility: CLINIC | Age: 24
End: 2024-07-31
Payer: COMMERCIAL

## 2024-07-31 DIAGNOSIS — J32.4 CHRONIC PANSINUSITIS: Primary | ICD-10-CM

## 2024-07-31 DIAGNOSIS — J33.9 NASAL POLYP: ICD-10-CM

## 2024-07-31 RX ORDER — CEFAZOLIN SODIUM 2 G/50ML
2 SOLUTION INTRAVENOUS
OUTPATIENT
Start: 2024-07-31

## 2024-07-31 RX ORDER — CEFAZOLIN SODIUM 2 G/50ML
2 SOLUTION INTRAVENOUS SEE ADMIN INSTRUCTIONS
OUTPATIENT
Start: 2024-07-31

## 2024-07-31 RX ORDER — DEXAMETHASONE SODIUM PHOSPHATE 4 MG/ML
10 INJECTION, SOLUTION INTRA-ARTICULAR; INTRALESIONAL; INTRAMUSCULAR; INTRAVENOUS; SOFT TISSUE ONCE
OUTPATIENT
Start: 2024-07-31 | End: 2024-07-31

## 2024-07-31 RX ORDER — PREDNISONE 10 MG/1
TABLET ORAL
Qty: 30 TABLET | Refills: 0 | Status: SHIPPED | OUTPATIENT
Start: 2024-07-31

## 2024-07-31 NOTE — PROGRESS NOTES
Received voicemail from patient regarding surgery. Spoke to patient and she would like to proceed with surgery with Dr. Alonso. Patient aware she will need CT scan prior and will be called to schedule.     Kamille Martell, RN, BSN  RN Care Coordinator, ENT Clinic

## 2024-08-01 ENCOUNTER — TELEPHONE (OUTPATIENT)
Dept: OTOLARYNGOLOGY | Facility: CLINIC | Age: 24
End: 2024-08-01
Payer: COMMERCIAL

## 2024-08-01 ENCOUNTER — PATIENT OUTREACH (OUTPATIENT)
Dept: OTOLARYNGOLOGY | Facility: CLINIC | Age: 24
End: 2024-08-01
Payer: COMMERCIAL

## 2024-08-01 NOTE — TELEPHONE ENCOUNTER
Left Voicemail (1st Attempt) for the patient to call back and schedule the following:    Appointment type: CT Sinus   Provider:   Return date: next available     Specialty phone number: [phone]  Additional appointment(s) needed:   Additional Notes:

## 2024-08-01 NOTE — TELEPHONE ENCOUNTER
Teaching Flowsheet - ENT  Relevant Diagnosis: Chronic pansinusitis  Teaching Topic: Image guided bilateral maxillary antrostomy, total ethmoidectomy, sphenoidotomy, frontal sinusotomy, possible septoplasty  Person(s) involved in teaching: Patient via telephone with Marina      Motivation Level: High  Asks Questions: Yes  Eager to Learn: Yes  Cooperative: Yes  Receptive (willing/able to accept information): Yes  Comments: Reviewed pre-op H and P, NPO prior to  surgery, pre-op scrub (will be mailed by surgery schedulers), reviewed post-op cares, activity and pain.     Patient demonstrates understanding of the following:  Reason for the appointment, diagnosis and treatment plan: Yes  Knowledge of proper use of medications and conditions for which they are ordered (with special attention to potential side effects or drug interactions): stop aspirin products 1 week before surgery Yes  Which situations necessitate calling provider and whom to contact: Yes  Nutritional needs and diet plan: Yes  Pain management techniques: Yes  Patient instructed on hand hygiene: Yes  How and/when to access community resources: Yes     Infection Prevention:  Patient demonstrates understanding of the following:  Surgical procedure site care taught: Yes  Signs and symptoms of infection taught: Yes  Wound care taught: Yes  Instructional Materials Used/Given: verbal instruction    Kamille Martell, RN, BSN  RN Care Coordinator, ENT Clinic

## 2024-08-06 ENCOUNTER — TELEPHONE (OUTPATIENT)
Dept: OTOLARYNGOLOGY | Facility: CLINIC | Age: 24
End: 2024-08-06
Payer: COMMERCIAL

## 2024-08-06 NOTE — TELEPHONE ENCOUNTER
Left Voicemail (2nd Attempt) for the patient to call back and schedule the following:    Appointment type: CT Sinus  Provider:   Return date: next available     Specialty phone number: [phone]  Additional appointment(s) needed:   Additional Notes:

## 2024-08-12 ENCOUNTER — TELEPHONE (OUTPATIENT)
Dept: OTOLARYNGOLOGY | Facility: CLINIC | Age: 24
End: 2024-08-12
Payer: COMMERCIAL

## 2024-08-12 NOTE — TELEPHONE ENCOUNTER
Left patient a voicemail via [employer]  to schedule surgery for Image guided bilateral maxillary antrostomy, total ethmoidectomy, sphenoidotomy, frontal sinusotomy, possible septoplasty with Dr. Alonso - Left Surgery Scheduling line for callback [phone]    Cathy Dailey on 8/12/2024 at 3:30 PM

## 2024-08-15 ENCOUNTER — HOSPITAL ENCOUNTER (OUTPATIENT)
Dept: CT IMAGING | Facility: CLINIC | Age: 24
Discharge: HOME | End: 2024-08-15
Attending: STUDENT IN AN ORGANIZED HEALTH CARE EDUCATION/TRAINING PROGRAM | Admitting: STUDENT IN AN ORGANIZED HEALTH CARE EDUCATION/TRAINING PROGRAM
Payer: COMMERCIAL

## 2024-08-15 DIAGNOSIS — J32.4 CHRONIC PANSINUSITIS: ICD-10-CM

## 2024-08-15 DIAGNOSIS — J33.9 NASAL POLYP: ICD-10-CM

## 2024-08-15 PROCEDURE — 70486 CT MAXILLOFACIAL W/O DYE: CPT

## 2024-08-26 NOTE — TELEPHONE ENCOUNTER
Left patient another voicemail via [facility]  to schedule surgery for Image guided bilateral maxillary antrostomy, total ethmoidectomy, sphenoidotomy, frontal sinusotomy, possible septoplasty with Dr. Alonso - Left Surgery Scheduling line for callback [phone]    Dylon Garcia  8/26/2024 at 2:05 PM

## 2024-09-03 NOTE — TELEPHONE ENCOUNTER
Left patient another voicemail via [facility]  to schedule surgery for Image guided bilateral maxillary antrostomy, total ethmoidectomy, sphenoidotomy, frontal sinusotomy, possible septoplasty with Dr. Alonso - Left Surgery Scheduling line for callback [phone]    Dylon Garcia  9/3/2024 at 1:10 PM

## 2024-09-03 NOTE — TELEPHONE ENCOUNTER
Scheduled surgery with Dr. Alonso on 10/21/2024 - patient requested a Monday as she has classes Tuesday-Friday    Spoke with: Patient    Surgery is located at [facility]    Patient will be seen for their H&P by PAC on 10/7/2024 at 315pm - Provided address & floor number    Does patient need a consult before upcoming surgery? No    Anesthesia type: General    Requested Imaging required for surgery: No    Patient is scheduled for their 1 week post op on 10/29/2024 at 420pm with Dr. Alonso    Patient will receive their surgery packet & surgical soap via mail per their preference - Confirmed address on file is up to date    Patient was not provided a start time for surgery & is aware they will receive this information at their PAC appointment as well as any pre-op instructions.    Additional comments: Patient was instructed to call back with any further questions or concerns.     Cathy Dailey on 9/3/2024 at 3:33 PM

## 2024-09-04 NOTE — TELEPHONE ENCOUNTER
FUTURE VISIT INFORMATION      SURGERY INFORMATION:  Date: 10/21/24  Location:  or  Surgeon:  Steffen Alonso MD   Anesthesia Type:  general  Procedure: Image guided bilateral maxillary antrostomy, total ethmoidectomy, sphenoidotomy, frontal sinusotomy, possible septoplasty   Consult: ov 24    RECORDS REQUESTED FROM:       Primary Care Provider: [facility]    Most recent EKG+ Tracin24- [facility]

## 2024-09-12 ENCOUNTER — OFFICE VISIT (OUTPATIENT)
Dept: ALLERGY | Facility: CLINIC | Age: 24
End: 2024-09-12
Payer: COMMERCIAL

## 2024-09-12 VITALS
SYSTOLIC BLOOD PRESSURE: 112 MMHG | HEART RATE: 100 BPM | BODY MASS INDEX: 20.23 KG/M2 | WEIGHT: 107.14 LBS | OXYGEN SATURATION: 98 % | DIASTOLIC BLOOD PRESSURE: 74 MMHG

## 2024-09-12 DIAGNOSIS — J33.9 NASAL POLYP: ICD-10-CM

## 2024-09-12 DIAGNOSIS — J30.1 SEASONAL ALLERGIC RHINITIS DUE TO POLLEN: Primary | ICD-10-CM

## 2024-09-12 DIAGNOSIS — R43.8 REDUCED SENSE OF SMELL: ICD-10-CM

## 2024-09-12 DIAGNOSIS — T78.1XXD ADVERSE FOOD REACTION, SUBSEQUENT ENCOUNTER: ICD-10-CM

## 2024-09-12 PROCEDURE — 99214 OFFICE O/P EST MOD 30 MIN: CPT | Performed by: INTERNAL MEDICINE

## 2024-09-12 RX ORDER — CETIRIZINE HYDROCHLORIDE 10 MG/1
10 TABLET ORAL DAILY
Qty: 30 TABLET | Refills: 5 | Status: SHIPPED | OUTPATIENT
Start: 2024-09-12

## 2024-09-12 NOTE — PROGRESS NOTES
Yanely Fiore was seen in the Allergy Clinic at [facility].    Yanely Fiore is a 24 year old female being seen today for ongoing evaluation of nasal polyps as well as allergic rhinitis.  Blood testing at the last appointment was positive to trees and grass and weeds.  Her food related symptoms sound like oral allergy syndrome however ragweed did not test positive.  She has mild itching of the mouth with certain melons and nuts.    She does have planned polyp surgery October 2024.    She does have allergy symptoms including sneezing as well as nasal congestion.      Since the last visit the patient has been having reduced sense of smell, sneezing, itchy mouth and rhinorrhea.      Latest Reference Range & Units 07/13/24 13:03   Allergen A alternata <0.10 KU(A)/L <0.10   Allergen A fumigatus <0.10 KU(A)/L <0.10   Allergen C herbarum <0.10 KU(A)/L <0.10   Allergen Cat Dander <0.10 KU(A)/L <0.10   Allergen Cedar IgE <0.10 KU(A)/L <0.10   Allergen D farinae <0.10 KU(A)/L <0.10   Allergen, D Pteronyssinus <0.10 KU(A)/L <0.10   Allergen Dog Dander <0.10 KU(A)/L <0.10   Allergen Elm <0.10 KU(A)/L 0.12 (H)   Allergen Epicoccum purpurascens IgE <0.10 KU(A)/L <0.10   Allergen, Kochia/Firebush <0.10 KU(A)/L 0.82 (H)   Allergen Maple <0.10 KU(A)/L 0.17 (H)   Allergen Mugwort IgE <0.10 KU(A)/L 0.15 (H)   Allergen Oak(white) <0.10 KU(A)/L <0.10   Allergen P notatum <0.10 KU(A)/L <0.10   Allergen Red [place] IgE <0.10 KU(A)/L <0.10   Allergen Sagebrush [place] IgE <0.10 KU(A)/L 0.15 (H)   Allergen Jeremy <0.10 KU(A)/L 1.31 (H)   Allergen Tree White [place] IgE <0.10 KU(A)/L <0.10   Allergen [place] Tree <0.10 KU(A)/L 0.29 (H)   Allergen Weed Nettle IgE <0.10 KU(A)/L <0.10   Allergen Sandoval <0.10 KU(A)/L <0.10   Allergen [place] <0.10 KU(A)/L <0.10   Allergen, English Plantain <0.10 KU(A)/L 0.10 (H)   Allergen, Giant Ragweed <0.10 KU(A)/L <0.10   Allergen Niranjan Grass <0.10 KU(A)/L <0.10    Allergen, Lamb's Quarters <0.10 KU(A)/L 0.52 (H)   Allergen, Ragweed Short <0.10 KU(A)/L <0.10   Allergen Russian Thistle <0.10 KU(A)/L 3.21 (H)   Allergen Sheep Sorrel IgE <0.10 KU(A)/L 0.18 (H)   Allergen, Silver Birch <0.10 KU(A)/L 0.14 (H)   Allergen White Lucas <0.10 KU(A)/L 0.41 (H)   (H): Data is abnormally high    PAST ALLERGY HISTORY:    She lost her sense of smell 5 to 10 years ago.  She saw an ENT provider on 6/11 and did have evidence of nasal polyps.  prednisone did help her sense of smell while she was taking it but then after stopping steroids her sense of smell disappeared.     In addition she talks about nasal itching and lip itching with dry fruits and potentially some nuts.  However she eats all forms of nuts without any hives or significant reactions.    Melon causes itching of her mouth also.    History reviewed. No pertinent past medical history.  History reviewed. No pertinent family history.  History reviewed. No pertinent surgical history.      Current Outpatient Medications:     cetirizine (ZYRTEC) 10 MG tablet, Take 1 tablet (10 mg) by mouth daily., Disp: 30 tablet, Rfl: 5    fluticasone (FLONASE) 50 MCG/ACT nasal spray, Spray 2 sprays into both nostrils 2 times daily, Disp: 11.1 mL, Rfl: 3    fluticasone (FLONASE) 50 MCG/ACT nasal spray, Spray 2 sprays into both nostrils 2 times daily (Patient not taking: Reported on 9/12/2024), Disp: 11.1 mL, Rfl: 3    predniSONE (DELTASONE) 10 MG tablet, Take 4 pills once daily for 3 days; then take 3 pills daily for 3 days; then take 2 pills daily for 3 days; then take 1 pill daily for 3 days.  Start taking 3 days before surgery and complete the remainder of the taper starting the day after surgery. (Patient not taking: Reported on 9/12/2024), Disp: 30 tablet, Rfl: 0  Allergies   Allergen Reactions    Ibuprofen Itching, Other (See Comments) and Swelling     Fever/cold sores    Nuts     Other Food Allergy     Perfume          EXAM:   /74   Pulse  100   Wt 48.6 kg (107 lb 2.3 oz)   SpO2 98%   BMI 20.23 kg/m      Constitutional:       General: She is not in acute distress.     Appearance: Normal appearance. She is not ill-appearing.   HENT:      Head: Normocephalic and atraumatic.      Nose: Nose normal. No congestion or rhinorrhea.      Mouth/Throat:      Mouth: Mucous membranes are moist.      Pharynx: Oropharynx is clear. No posterior oropharyngeal erythema.   Eyes:      General:         Right eye: No discharge.         Left eye: No discharge.   Cardiovascular:      Rate and Rhythm: Normal rate and regular rhythm.      Heart sounds: Normal heart sounds.   Pulmonary:      Effort: Pulmonary effort is normal.      Breath sounds: Normal breath sounds. No wheezing or rhonchi.   Skin:     General: Skin is warm.      Findings: No erythema or rash.   Neurological:      General: No focal deficit present.      Mental Status: She is alert. Mental status is at baseline.   Psychiatric:         Mood and Affect: Mood normal.         Behavior: Behavior normal.        ASSESSMENT/PLAN:  Yanely Fiore is a 24 year old female seen today with allergic rhinitis with blood testing positive to trees and grass and weeds.  Also has mild symptoms with foods that are consistent with oral allergy syndrome but ragweed did not test positive.  This is more difficult to explain.  Will add Zyrtec due to the sneezing and itching sensations.  She will continue with the Flonase.    Continue Flonase 2 spray each nostril daily  Start Zyrtec 10 mg at night  May consider allergy immunotherapy depending on her symptoms after surgery    Follow-up after the sinus surgery      Thank you for allowing me to participate in the care of Yanely Fiore.      I spent 30 minutes on the date of the encounter doing chart review, history and exam, documentation and further coordination as noted above exclusive of separately reported interpretations.  A  was used to gain information today.    Abner  MD Julio C  Allergy/Immunology  [facility]

## 2024-09-12 NOTE — PATIENT INSTRUCTIONS
Continue Flonase 2 spray each nostril daily  Start Zyrtec 10 mg at night  May consider allergy immunotherapy if she has significant allergies and is not responding to medications.            Allergy Staff Appt Hours Shot Hours Location       Physician   Abner Bunch MD      Support Staff   JUNE Greer RN, MA Emily J., MA      Mondays Tuesdays Thursdays and Fridays:      Christine 7-5      Wednesdays         Close                Mondays, Tuesdays and Fridays:  7:20 - 3:40              [facility]  [address]  [place] [address]  Allergy appointment  line: [phone]    Pulmonary Function Scheduling:  [place]: [phone]           Questions about cost of your care  For questions about your cost of your visit, procedure, lab or imaging contact: [employer] Price Line [phone] or visit:  [email or web address]    Prescription Assistance  If you need assistance with your prescriptions (cost, coverage, etc) please contact: [email or web address] Prescription Assistance Program [phone]    Important Scheduling Information  All visits for food challenges, medication/drug allergy testing, and drug challenges MUST be scheduled through the allergy clinic nurse. Please contact them via [employer] or by calling the clinic at [phone] and asking to speak with an allergy nurse. They will provide additional information and instructions for the appointment. Discontinue oral antihistamines 7 days prior to the appointment. Discontinue nasal and ocular antihistamines 1 day prior to the appointment.    Appointments for skin testing: Appointment will last approximately 45 minutes.  Please call the appointment line for your clinic to schedule.  Discontinue oral antihistamines 7 days prior to the appointment.  Discontinue nasal and ocular antihistamines 1 days prior to appointment.    Thank you for trusting us with your care. Please feel  free to contact us with any questions or concerns you may have.

## 2024-09-12 NOTE — LETTER
9/12/2024      Yanely Fiore  [address] [facility] [address]      Dear Colleague,    Thank you for referring your patient, Yanely Fiore, to the [facility] [place]. Please see a copy of my visit note below.    Yanely Fiore was seen in the Allergy Clinic at [facility].    Yanely Fiore is a 24 year old female being seen today for ongoing evaluation of nasal polyps as well as allergic rhinitis.  Blood testing at the last appointment was positive to trees and grass and weeds.  Her food related symptoms sound like oral allergy syndrome however ragweed did not test positive.  She has mild itching of the mouth with certain melons and nuts.    She does have planned polyp surgery October 2024.    She does have allergy symptoms including sneezing as well as nasal congestion.      Since the last visit the patient has been having reduced sense of smell, sneezing, itchy mouth and rhinorrhea.      Latest Reference Range & Units 07/13/24 13:03   Allergen A alternata <0.10 KU(A)/L <0.10   Allergen A fumigatus <0.10 KU(A)/L <0.10   Allergen C herbarum <0.10 KU(A)/L <0.10   Allergen Cat Dander <0.10 KU(A)/L <0.10   Allergen Cedar IgE <0.10 KU(A)/L <0.10   Allergen D farinae <0.10 KU(A)/L <0.10   Allergen, D Pteronyssinus <0.10 KU(A)/L <0.10   Allergen Dog Dander <0.10 KU(A)/L <0.10   Allergen Elm <0.10 KU(A)/L 0.12 (H)   Allergen Epicoccum purpurascens IgE <0.10 KU(A)/L <0.10   Allergen, Kochia/Firebush <0.10 KU(A)/L 0.82 (H)   Allergen Maple <0.10 KU(A)/L 0.17 (H)   Allergen Mugwort IgE <0.10 KU(A)/L 0.15 (H)   Allergen Oak(white) <0.10 KU(A)/L <0.10   Allergen P notatum <0.10 KU(A)/L <0.10   Allergen Red [place] IgE <0.10 KU(A)/L <0.10   Allergen Sagebrush [place] IgE <0.10 KU(A)/L 0.15 (H)   Allergen Jeremy <0.10 KU(A)/L 1.31 (H)   Allergen Tree [place] IgE <0.10 KU(A)/L <0.10   Allergen [place] Tree <0.10 KU(A)/L 0.29 (H)   Allergen Weed Nettle IgE <0.10 KU(A)/L  <0.10   Allergen [place] <0.10 KU(A)/L <0.10   Allergen [place] <0.10 KU(A)/L <0.10   Allergen, English Plantain <0.10 KU(A)/L 0.10 (H)   Allergen, Giant Ragweed <0.10 KU(A)/L <0.10   Allergen Niranjan Grass <0.10 KU(A)/L <0.10   Allergen, Lamb's Quarters <0.10 KU(A)/L 0.52 (H)   Allergen, Ragweed Short <0.10 KU(A)/L <0.10   Allergen Russian Thistle <0.10 KU(A)/L 3.21 (H)   Allergen Sheep Sorrel IgE <0.10 KU(A)/L 0.18 (H)   Allergen, Silver Birch <0.10 KU(A)/L 0.14 (H)   Allergen White Lucas <0.10 KU(A)/L 0.41 (H)   (H): Data is abnormally high    PAST ALLERGY HISTORY:    She lost her sense of smell 5 to 10 years ago.  She saw an ENT provider on 6/11 and did have evidence of nasal polyps.  prednisone did help her sense of smell while she was taking it but then after stopping steroids her sense of smell disappeared.     In addition she talks about nasal itching and lip itching with dry fruits and potentially some nuts.  However she eats all forms of nuts without any hives or significant reactions.    Melon causes itching of her mouth also.    History reviewed. No pertinent past medical history.  History reviewed. No pertinent family history.  History reviewed. No pertinent surgical history.      Current Outpatient Medications:      cetirizine (ZYRTEC) 10 MG tablet, Take 1 tablet (10 mg) by mouth daily., Disp: 30 tablet, Rfl: 5     fluticasone (FLONASE) 50 MCG/ACT nasal spray, Spray 2 sprays into both nostrils 2 times daily, Disp: 11.1 mL, Rfl: 3     fluticasone (FLONASE) 50 MCG/ACT nasal spray, Spray 2 sprays into both nostrils 2 times daily (Patient not taking: Reported on 9/12/2024), Disp: 11.1 mL, Rfl: 3     predniSONE (DELTASONE) 10 MG tablet, Take 4 pills once daily for 3 days; then take 3 pills daily for 3 days; then take 2 pills daily for 3 days; then take 1 pill daily for 3 days.  Start taking 3 days before surgery and complete the remainder of the taper starting the day after surgery. (Patient not taking:  Reported on 9/12/2024), Disp: 30 tablet, Rfl: 0  Allergies   Allergen Reactions     Ibuprofen Itching, Other (See Comments) and Swelling     Fever/cold sores     Nuts      Other Food Allergy      Perfume          EXAM:   /74   Pulse 100   Wt 48.6 kg (107 lb 2.3 oz)   SpO2 98%   BMI 20.23 kg/m      Constitutional:       General: She is not in acute distress.     Appearance: Normal appearance. She is not ill-appearing.   HENT:      Head: Normocephalic and atraumatic.      Nose: Nose normal. No congestion or rhinorrhea.      Mouth/Throat:      Mouth: Mucous membranes are moist.      Pharynx: Oropharynx is clear. No posterior oropharyngeal erythema.   Eyes:      General:         Right eye: No discharge.         Left eye: No discharge.   Cardiovascular:      Rate and Rhythm: Normal rate and regular rhythm.      Heart sounds: Normal heart sounds.   Pulmonary:      Effort: Pulmonary effort is normal.      Breath sounds: Normal breath sounds. No wheezing or rhonchi.   Skin:     General: Skin is warm.      Findings: No erythema or rash.   Neurological:      General: No focal deficit present.      Mental Status: She is alert. Mental status is at baseline.   Psychiatric:         Mood and Affect: Mood normal.         Behavior: Behavior normal.        ASSESSMENT/PLAN:  Yanely Fiore is a 24 year old female seen today with allergic rhinitis with blood testing positive to trees and grass and weeds.  Also has mild symptoms with foods that are consistent with oral allergy syndrome but ragweed did not test positive.  This is more difficult to explain.  Will add Zyrtec due to the sneezing and itching sensations.  She will continue with the Flonase.    Continue Flonase 2 spray each nostril daily  Start Zyrtec 10 mg at night  May consider allergy immunotherapy depending on her symptoms after surgery    Follow-up after the sinus surgery      Thank you for allowing me to participate in the care of Yanely Fiore.      I spent  30 minutes on the date of the encounter doing chart review, history and exam, documentation and further coordination as noted above exclusive of separately reported interpretations.  A  was used to gain information today.    Abner Bunch MD  Allergy/Immunology  [facility]      Again, thank you for allowing me to participate in the care of your patient.        Sincerely,        Abner Bunch MD

## 2024-10-07 ENCOUNTER — LAB (OUTPATIENT)
Dept: LAB | Facility: CLINIC | Age: 24
End: 2024-10-07
Payer: COMMERCIAL

## 2024-10-07 ENCOUNTER — ANESTHESIA EVENT (OUTPATIENT)
Dept: SURGERY | Facility: AMBULATORY SURGERY CENTER | Age: 24
End: 2024-10-07
Payer: COMMERCIAL

## 2024-10-07 ENCOUNTER — OFFICE VISIT (OUTPATIENT)
Dept: SURGERY | Facility: CLINIC | Age: 24
End: 2024-10-07
Payer: COMMERCIAL

## 2024-10-07 ENCOUNTER — PRE VISIT (OUTPATIENT)
Dept: SURGERY | Facility: CLINIC | Age: 24
End: 2024-10-07

## 2024-10-07 VITALS
TEMPERATURE: 98.4 F | DIASTOLIC BLOOD PRESSURE: 79 MMHG | SYSTOLIC BLOOD PRESSURE: 114 MMHG | HEIGHT: 61 IN | BODY MASS INDEX: 20.88 KG/M2 | RESPIRATION RATE: 16 BRPM | HEART RATE: 92 BPM | OXYGEN SATURATION: 97 % | WEIGHT: 110.6 LBS

## 2024-10-07 DIAGNOSIS — J32.4 CHRONIC PANSINUSITIS: ICD-10-CM

## 2024-10-07 DIAGNOSIS — Z01.818 PRE-OPERATIVE EXAMINATION: Primary | ICD-10-CM

## 2024-10-07 DIAGNOSIS — Z01.818 PRE-OPERATIVE EXAMINATION: ICD-10-CM

## 2024-10-07 LAB
ANION GAP SERPL CALCULATED.3IONS-SCNC: 12 MMOL/L (ref 7–15)
BUN SERPL-MCNC: 6.9 MG/DL (ref 6–20)
CALCIUM SERPL-MCNC: 9.8 MG/DL (ref 8.8–10.4)
CHLORIDE SERPL-SCNC: 105 MMOL/L (ref 98–107)
CREAT SERPL-MCNC: 0.56 MG/DL (ref 0.51–0.95)
EGFRCR SERPLBLD CKD-EPI 2021: >90 ML/MIN/1.73M2
GLUCOSE SERPL-MCNC: 96 MG/DL (ref 70–99)
HCO3 SERPL-SCNC: 23 MMOL/L (ref 22–29)
POTASSIUM SERPL-SCNC: 3.9 MMOL/L (ref 3.4–5.3)
SODIUM SERPL-SCNC: 140 MMOL/L (ref 135–145)

## 2024-10-07 PROCEDURE — 80048 BASIC METABOLIC PNL TOTAL CA: CPT | Performed by: PATHOLOGY

## 2024-10-07 PROCEDURE — 36415 COLL VENOUS BLD VENIPUNCTURE: CPT | Performed by: PATHOLOGY

## 2024-10-07 PROCEDURE — 99203 OFFICE O/P NEW LOW 30 MIN: CPT | Performed by: PHYSICIAN ASSISTANT

## 2024-10-07 ASSESSMENT — PAIN SCALES - GENERAL: PAINLEVEL: NO PAIN (0)

## 2024-10-07 ASSESSMENT — ENCOUNTER SYMPTOMS: SEIZURES: 0

## 2024-10-07 NOTE — PATIENT INSTRUCTIONS
Preparing for Your Surgery      Name:  Yanely Fiore   MRN:  [id number]   :  2000   Today's Date:  10/7/2024         Arriving for surgery:  Surgery date:  10/21/2024  Arrival time:  5:45 AM    Restrictions due to COVID 19:    Please maintain social distance.  Masking is optional        parking is available for anyone with mobility limitations or disabilities. (Monday- Friday 7 am- 5 pm)    Please come to:    [facility]  [address] [place] [id number]    Check in on the 5th floor, Ambulatory Surgery Center.    What can I eat or drink?    -  You may eat and drink normally until 8 hours before arrival time  (Until 9:45 PM 10/20)  -  You may have clear liquids until 2 hours before arrival time  (Until 3:45 AM)    Examples of clear liquids:  Water  Clear broth  Juices (apple, white grape, white cranberry  and cider) without pulp  Noncarbonated, powder based beverages  (lemonade and Arun-Aid)  Sodas (Sprite, 7-Up, ginger ale and seltzer)  Coffee or tea (without milk or cream)  Gatorade    --No alcohol or cannabis products for at least 24 hours before surgery    Which medicines can I take?    Hold Aspirin for 7 days before surgery.   Hold Multivitamins for 7 days before surgery.  Hold Supplements for 7 days before surgery.  Hold Ibuprofen (Advil, Motrin) for 1 day before surgery--unless otherwise directed by surgeon.  Hold Naproxen (Aleve) for 4 days before surgery.    **Hold Zyrtec for 24 hours before surgery    -  PLEASE TAKE the following medications the day of surgery   Flonase if needed    How do I prepare myself?  - Please take 2 showers (one the night prior to surgery and one the morning of surgery) using Scrubcare or Hibiclens soap.    Use this soap only from the neck to your toes.     Leave the soap on your skin for one minute--then rinse thoroughly.      You may use your own shampoo and conditioner; no other hair products.   - Please remove all jewelry and  body piercings.  - No lotions, deodorants or fragrance.  - No makeup or fingernail polish.   - Bring your ID and insurance card.    -If you have a Deep Brain Stimulator, a Spinal Cord Stimulator or any implanted Neuro device you must bring the remote to the Surgery Center          ALL PATIENTS ARE REQUIRED TO HAVE A RESPONSIBLE ADULT TO DRIVE AND BE IN ATTENDANCE WITH THEM FOR 24 HOURS FOLLOWING SURGERY       Covid testing policy as of 12/06/2022  Your surgeon will notify and schedule you for a COVID test if one is needed before surgery--please direct any questions or COVID symptoms to your surgeon      Questions or Concerns:    -For questions regarding the day of surgery please contact the Ambulatory Surgery Center at [phone].    -If you have health changes between today and your surgery please contact your surgeon.     For questions after surgery please call your surgeons office.

## 2024-10-07 NOTE — H&P
Pre-Operative H & P     CC:  Preoperative exam to assess for increased cardiopulmonary risk while undergoing surgery and anesthesia.    Date of Encounter: 10/7/2024  Primary Care Physician:  No Ref-Primary, Physician     Reason for visit:   Encounter Diagnoses   Name Primary?    Pre-operative examination Yes    Chronic pansinusitis        HPI  Yanely Fiore is a 24 year old female who presents for pre-operative H & P in preparation for  Procedure Information       Case: [id number] Date/Time: 10/21/24 0715    Procedure: Image guided bilateral maxillary antrostomy, total ethmoidectomy, sphenoidotomy, frontal sinusotomy, possible septoplasty (Bilateral: Nose)    Anesthesia type: General    Diagnosis: Chronic pansinusitis [J32.4]    Pre-op diagnosis: Chronic pansinusitis [J32.4]    Location: [place] [address] / [facility]    Providers: Steffen Alonso MD            The patient is a 24-year-old woman with no significant past medical history who has been following with Dr. Alonso for her allergies, sinusitis, nasal polyps and the loss of sense of smell.  She has tried conservative management but after meeting with Dr. Alonso on 7/30/2024 she is now scheduled for the procedure as above    History is obtained from the patient, patient's mother with professional [employer]  and chart review    Hx of abnormal bleeding or anti-platelet use: none    Menstrual history: Patient's last menstrual period was 09/05/2024 (approximate).:      Past Medical History  Past Medical History:   Diagnosis Date    Chronic pansinusitis     Nasal polyp     Seasonal allergic rhinitis        Past Surgical History  History reviewed. No pertinent surgical history.    Prior to Admission Medications  Current Outpatient Medications   Medication Sig Dispense Refill    cetirizine (ZYRTEC) 10 MG tablet Take 1 tablet (10 mg) by mouth daily. (Patient taking differently: Take 10 mg by mouth as needed.) 30 tablet  5    fluticasone (FLONASE) 50 MCG/ACT nasal spray Spray 2 sprays into both nostrils 2 times daily (Patient taking differently: Spray 2 sprays into both nostrils as needed.) 11.1 mL 3    fluticasone (FLONASE) 50 MCG/ACT nasal spray Spray 2 sprays into both nostrils 2 times daily (Patient taking differently: Spray 2 sprays into both nostrils as needed.) 11.1 mL 3    predniSONE (DELTASONE) 10 MG tablet Take 4 pills once daily for 3 days; then take 3 pills daily for 3 days; then take 2 pills daily for 3 days; then take 1 pill daily for 3 days.  Start taking 3 days before surgery and complete the remainder of the taper starting the day after surgery. (Patient not taking: Reported on 9/12/2024) 30 tablet 0       Allergies  Allergies   Allergen Reactions    Ibuprofen Itching, Other (See Comments) and Swelling     Fever/cold sores    Nuts     Other Food Allergy     Perfume     Tylenol [Acetaminophen] Unknown       Social History  Social History     Socioeconomic History    Marital status: Single     Spouse name: Not on file    Number of children: Not on file    Years of education: Not on file    Highest education level: Not on file   Occupational History    Not on file   Tobacco Use    Smoking status: Never    Smokeless tobacco: Never   Substance and Sexual Activity    Alcohol use: Never    Drug use: Never    Sexual activity: Not on file   Other Topics Concern    Not on file   Social History Narrative    Not on file     Social Determinants of Health     Financial Resource Strain: Not on File (11/28/2023)    Received from [facility]    Financial Resource Strain     Financial Resource Strain: 0   Food Insecurity: Not on File (9/26/2024)    [facility] from [facility]    Food Insecurity     Food: 0   Transportation Needs: Not on File (11/28/2023)    Received from [facility]    Transportation Needs     Transportation: 0   Physical Activity: Not on File (11/28/2023)    Received from [facility]    Physical Activity     Physical  "Activity: 0   Stress: Not on File (2023)    Received from [facility]    Stress     Stress: 0   Social Connections: Not on File (2024)    Received from [facility]    Social Connections     Connectedness: 0   Interpersonal Safety: Not on file   Housing Stability: Not on File (2023)    Received from [facility]    Housing Stability     Housin       Family History  Family History   Problem Relation Age of Onset    Anesthesia Reaction No family hx of     Bleeding Disorder No family hx of     Clotting Disorder No family hx of        Review of Systems  The complete review of systems is negative other than noted in the HPI or here.   Anesthesia Evaluation   Pt has not had prior anesthetic         ROS/MED HX  ENT/Pulmonary: Comment: Nasal polyp  Anosmia  Sinusitis     (+)           allergic rhinitis,                             Neurologic:  - neg neurologic ROS  (-) no seizures and no CVA   Cardiovascular:       METS/Exercise Tolerance: >4 METS    Hematologic:  - neg hematologic  ROS     Musculoskeletal:  - neg musculoskeletal ROS     GI/Hepatic:  - neg GI/hepatic ROS  (-) GERD   Renal/Genitourinary:  - neg Renal ROS     Endo:  - neg endo ROS     Psychiatric/Substance Use:  - neg psychiatric ROS     Infectious Disease:  - neg infectious disease ROS     Malignancy:  - neg malignancy ROS     Other:  - neg other ROS          /79 (BP Location: Right arm, Patient Position: Sitting, Cuff Size: Adult Regular)   Pulse 92   Temp 98.4  F (36.9  C) (Oral)   Resp 16   Ht 1.549 m (5' 1\")   Wt 50.2 kg (110 lb 9.6 oz)   LMP 2024 (Approximate)   SpO2 97%   Breastfeeding No   BMI 20.90 kg/m      Physical Exam   Constitutional: Awake, alert, cooperative, no apparent distress, and appears stated age.  Eyes: Pupils equal, round and reactive to light, extra ocular muscles intact, sclera clear, conjunctiva normal.  HENT: Normocephalic, oral pharynx with moist mucus membranes, good dentition. No goiter " appreciated.   Respiratory: Clear to auscultation bilaterally, no crackles or wheezing.  Cardiovascular: Regular rate and rhythm, normal S1 and S2, and no murmur noted.  Carotids +2, no bruits. No edema. Palpable pulses to radial  DP and PT arteries.   GI: Normal bowel sounds, soft, non-distended, non-tender, no masses palpated, no hepatosplenomegaly.   Lymph/Hematologic: No cervical lymphadenopathy and no supraclavicular lymphadenopathy.  Genitourinary:  defer  Skin: Warm and dry.  No rashes at anticipated surgical site.   Musculoskeletal: Full ROM of neck. There is no redness, warmth, or swelling of the joints. Gross motor strength is normal.    Neurologic: Awake, alert, oriented to name, place and time. Cranial nerves II-XII are grossly intact. Gait is normal.   Neuropsychiatric: Calm, cooperative. Normal affect.     Prior Labs/Diagnostic Studies   All labs and imaging personally reviewed   BASIC METABOLIC PANEL  Specimen: Blood - Blood specimen (specimen)  Component  Ref Range & Units 5 mo ago Comments   SODIUM  136 - 145 mmol/L 141    POTASSIUM  3.5 - 5.1 mmol/L 3.3 Low     CHLORIDE  98 - 107 mmol/L 112 High     CO2,TOTAL  22 - 29 mmol/L 20 Low     ANION GAP  5 - 18 9    GLUCOSE  70 - 99 mg/dL 94    CALCIUM  8.6 - 10.0 mg/dL 8.2 Low     BUN  6 - 20 mg/dL 7    CREATININE  0.50 - 0.90 mg/dL 0.53    BUN/CREAT RATIO  10 - 20 13    eGFR  >90 mL/min/1.73m2 >90 As of 03/15/2022, eGFR is calculated by the CKD-EPI creatinine equation without race adjustment.  eGFR can be influenced by muscle mass, exercise, and diet.  The reported eGFR is an estimation only and is only applicable if the renal function is stable.   Resulting Agency [facility]    Specimen Collected: 04/16/24 12:16 PM    Performed by: [facility] Last Resulted: 04/16/24  1:08 PM   CBC WITH AUTO DIFFERENTIAL  Specimen: Blood - Blood specimen (specimen)  Component  Ref Range & Units 5 mo ago   WHITE BLOOD COUNT           4.5 - 11.0 thou/cu mm 9.7   RED BLOOD COUNT            4.00 - 5.20 mil/cu mm 3.78 Low    HEMOGLOBIN                12.0 - 16.0 g/dL 12.0   HEMATOCRIT                33.0 - 51.0 % 34.8   MCV                        80 - 100 fL 92   MCH                        26.0 - 34.0 pg 31.7   MCHC                      32.0 - 36.0 g/dL 34.5   RDW                        11.5 - 15.5 % 11.4 Low    PLATELET COUNT            140 - 440 thou/cu mm 264   MPV                        6.5 - 11.0 fL 9.1   NRBC                      % 0.0   ABS NRBC  thou /cu mm 0.0   % NEUT  % 84.8   % LYMPH  % 8.2   % MONO  % 5.7   % EOS  % 0.4   % BASO  % 0.5   % IMMATURE GRAN (METAS,MYELOS,PROS)  % 0.4   ABSOLUTE NEUTROPHILS      1.7 - 7.0 thou/cu mm 8.2 High    ABSOLUTE LYMPHOCYTES      0.9 - 2.9 thou/cu mm 0.8 Low    ABSOLUTE MONOCYTES        <0.9 thou/cu mm 0.6   ABSOLUTE EOSINOPHILS      <0.5 thou/cu mm 0.0   ABSOLUTE BASOPHILS        <0.3 thou/cu mm 0.1   ABSOLUTE IMMATURE GRANULOCYTES(METAS,MYELOS,PROS)  <0.3 thou/cu mm 0.0   Resulting Agency [facility]   Specimen Collected: 04/16/24 12:16 PM    Performed by: [facility] Last Resulted: 04/16/24 12:30 PM       EKG/ stress test - if available please see in ROS above       The patient's records and results personally reviewed by this provider.     Outside records reviewed from: Care Everywhere    LAB/DIAGNOSTIC STUDIES TODAY:       Latest Reference Range & Units 10/07/24 15:04   Sodium 135 - 145 mmol/L 140   Potassium 3.4 - 5.3 mmol/L 3.9   Chloride 98 - 107 mmol/L 105   Carbon Dioxide (CO2) 22 - 29 mmol/L 23   Urea Nitrogen 6.0 - 20.0 mg/dL 6.9   Creatinine 0.51 - 0.95 mg/dL 0.56   GFR Estimate >60 mL/min/1.73m2 >90   Calcium 8.8 - 10.4 mg/dL 9.8   Anion Gap 7 - 15 mmol/L 12   Glucose 70 - 99 mg/dL 96         Assessment    Yanely Fiore is a 24 year old female seen as a PAC referral for risk assessment and optimization for anesthesia.    Plan/Recommendations  Pt  "will be optimized for the proposed procedure.  See below for details on the assessment, risk, and preoperative recommendations    NEUROLOGY  - No history of TIA, CVA or seizure  -Post Op delirium risk factors:  No risk identified    ENT  - No current airway concerns.  Will need to be reassessed day of surgery.  Mallampati: III  TM: > 3    ~ Sinusitis/ seasonal allergies/nasal polyp/anosmia - procedure as above.     CARDIAC  - No history of CAD, Hypertension, and Afib  - METS (Metabolic Equivalents)  Patient performs 4 or more METS exercise without symptoms             Total Score: 0      RCRI-Very low risk: Class 1 0.4% complication rate             Total Score: 0    ~ The patient went to the ED on 4/16/24 for syncope. It was felt she was having a stress reaction, agigitation and hyperventilation after a family member suffered a medical emergency. Her work up in the ED was unremarkable. She reports no return of symptoms.     PULMONARY  - Obstructive Sleep Apnea  No current risk of obstructive sleep apnea   OLU Low Risk             Total Score: 0      - Denies asthma or inhaler use  - Tobacco History    History   Smoking Status    Never   Smokeless Tobacco    Never       GI  PONV High Risk  Total Score: 3           1 AN PONV: Pt is Female    1 AN PONV: Patient is not a current smoker    1 AN PONV: Intended Post Op Opioids        /RENAL  - Baseline Creatinine  0.53    ENDOCRINE    - BMI: Estimated body mass index is 20.9 kg/m  as calculated from the following:    Height as of this encounter: 1.549 m (5' 1\").    Weight as of this encounter: 50.2 kg (110 lb 9.6 oz).  Healthy Weight (BMI 18.5-24.9)  - No history of Diabetes Mellitus    HEME  VTE Low Risk 0.26%             Total Score: 0      - No history of abnormal bleeding or antiplatelet use.      MSK  Patient is NOT Frail             Total Score: 0          Different anesthesia methods/types have been discussed with the patient, but they are aware that the final " plan will be decided by the assigned anesthesia provider on the date of service.    The patient is optimized for their procedure. AVS with information on surgery time/arrival time, meds and NPO status given by nursing staff. No further diagnostic testing indicated.      On the day of service:     Prep time: 10 minutes  Visit time: 18 minutes  Documentation time: 3 minutes  ------------------------------------------  Total time: 31 minutes      Em Beltrán PA-C  Preoperative Assessment Center  [facility]  Clinic and Surgery Center  Phone: [phone]  Fax: [phone]

## 2024-10-15 ENCOUNTER — LAB REQUISITION (OUTPATIENT)
Dept: LAB | Facility: CLINIC | Age: 24
End: 2024-10-15
Payer: COMMERCIAL

## 2024-10-15 ENCOUNTER — TELEPHONE (OUTPATIENT)
Dept: OPHTHALMOLOGY | Facility: CLINIC | Age: 24
End: 2024-10-15

## 2024-10-15 ENCOUNTER — TELEPHONE (OUTPATIENT)
Dept: OPHTHALMOLOGY | Facility: CLINIC | Age: 24
End: 2024-10-15
Payer: COMMERCIAL

## 2024-10-15 DIAGNOSIS — R53.83 OTHER FATIGUE: ICD-10-CM

## 2024-10-15 PROCEDURE — 84443 ASSAY THYROID STIM HORMONE: CPT | Mod: ORL | Performed by: INTERNAL MEDICINE

## 2024-10-15 PROCEDURE — 80053 COMPREHEN METABOLIC PANEL: CPT | Mod: ORL | Performed by: INTERNAL MEDICINE

## 2024-10-15 NOTE — TELEPHONE ENCOUNTER
Received message from Eye  that patient is complaining of eye pain.     Triage will reach out to patient.     Li Soto RN 8:03 AM 10/15/24

## 2024-10-16 LAB
ALBUMIN SERPL BCG-MCNC: 4.7 G/DL (ref 3.5–5.2)
ALP SERPL-CCNC: 59 U/L (ref 40–150)
ALT SERPL W P-5'-P-CCNC: 13 U/L (ref 0–50)
ANION GAP SERPL CALCULATED.3IONS-SCNC: 12 MMOL/L (ref 7–15)
AST SERPL W P-5'-P-CCNC: 17 U/L (ref 0–45)
BILIRUB SERPL-MCNC: 0.4 MG/DL
BUN SERPL-MCNC: 6.1 MG/DL (ref 6–20)
CALCIUM SERPL-MCNC: 9.5 MG/DL (ref 8.8–10.4)
CHLORIDE SERPL-SCNC: 104 MMOL/L (ref 98–107)
CREAT SERPL-MCNC: 0.61 MG/DL (ref 0.51–0.95)
EGFRCR SERPLBLD CKD-EPI 2021: >90 ML/MIN/1.73M2
GLUCOSE SERPL-MCNC: 93 MG/DL (ref 70–99)
HCO3 SERPL-SCNC: 23 MMOL/L (ref 22–29)
POTASSIUM SERPL-SCNC: 3.5 MMOL/L (ref 3.4–5.3)
PROT SERPL-MCNC: 7.4 G/DL (ref 6.4–8.3)
SODIUM SERPL-SCNC: 139 MMOL/L (ref 135–145)
TSH SERPL DL<=0.005 MIU/L-ACNC: 1.95 UIU/ML (ref 0.3–4.2)

## 2024-10-17 NOTE — TELEPHONE ENCOUNTER
Called and spoke to Yanely     Made her an appointment for 10/25 @ 9734 with Dr. Calzada     Pt has had eye pain for a year in the [facility] eye    Eyes feel tired     Wears eyeglasses     No irritation or redness     Yanely rates her eye pain at a 9     Confirmed and provided clinic address and wait time     Marilin Garcia Communication Facilitator on 10/17/2024 at 1:23 PM

## 2024-10-18 RX ORDER — OXYCODONE HYDROCHLORIDE 5 MG/1
10 TABLET ORAL
Status: CANCELLED | OUTPATIENT
Start: 2024-10-18

## 2024-10-18 RX ORDER — ONDANSETRON 2 MG/ML
4 INJECTION INTRAMUSCULAR; INTRAVENOUS EVERY 30 MIN PRN
Status: CANCELLED | OUTPATIENT
Start: 2024-10-18

## 2024-10-18 RX ORDER — ONDANSETRON 4 MG/1
4 TABLET, ORALLY DISINTEGRATING ORAL EVERY 30 MIN PRN
Status: CANCELLED | OUTPATIENT
Start: 2024-10-18

## 2024-10-18 RX ORDER — DEXAMETHASONE SODIUM PHOSPHATE 10 MG/ML
4 INJECTION, SOLUTION INTRAMUSCULAR; INTRAVENOUS
Status: CANCELLED | OUTPATIENT
Start: 2024-10-18

## 2024-10-18 RX ORDER — NALOXONE HYDROCHLORIDE 0.4 MG/ML
0.1 INJECTION, SOLUTION INTRAMUSCULAR; INTRAVENOUS; SUBCUTANEOUS
Status: CANCELLED | OUTPATIENT
Start: 2024-10-18

## 2024-10-20 ASSESSMENT — ENCOUNTER SYMPTOMS: SEIZURES: 0

## 2024-10-20 NOTE — ANESTHESIA PREPROCEDURE EVALUATION
"Anesthesia Pre-Procedure Evaluation    Patient: Yanely Fiore   MRN: [id number] : 2000        Procedure : Procedure(s):  Image guided bilateral maxillary antrostomy, total ethmoidectomy, sphenoidotomy, frontal sinusotomy, possible septoplasty          Past Medical History:   Diagnosis Date    Chronic pansinusitis     Nasal polyp     Seasonal allergic rhinitis       No past surgical history on file.   Allergies   Allergen Reactions    Ibuprofen Itching, Other (See Comments) and Swelling     Fever/cold sores    Nuts     Other Food Allergy     Perfume     Tylenol [Acetaminophen] Unknown      Social History     Tobacco Use    Smoking status: Never    Smokeless tobacco: Never   Substance Use Topics    Alcohol use: Never      Wt Readings from Last 1 Encounters:   10/07/24 50.2 kg (110 lb 9.6 oz)        Anesthesia Evaluation   Pt has not had prior anesthetic         ROS/MED HX  ENT/Pulmonary: Comment: Nasal polyp  Anosmia  Sinusitis     (+)           allergic rhinitis,                             Neurologic:  - neg neurologic ROS  (-) no seizures and no CVA   Cardiovascular:       METS/Exercise Tolerance: >4 METS    Hematologic:  - neg hematologic  ROS     Musculoskeletal:  - neg musculoskeletal ROS     GI/Hepatic:  - neg GI/hepatic ROS  (-) GERD   Renal/Genitourinary:  - neg Renal ROS     Endo:  - neg endo ROS     Psychiatric/Substance Use:  - neg psychiatric ROS     Infectious Disease:  - neg infectious disease ROS     Malignancy:  - neg malignancy ROS     Other:  - neg other ROS          Physical Exam    Airway        Mallampati: II   TM distance: > 3 FB   Neck ROM: full   Mouth opening: > 3 cm    Respiratory Devices and Support         Dental       (+) Minor Abnormalities - some fillings, tiny chips      Cardiovascular   cardiovascular exam normal          Pulmonary   pulmonary exam normal                OUTSIDE LABS:  CBC: No results found for: \"WBC\", \"HGB\", \"HCT\", \"PLT\"  BMP:   Lab Results   Component Value " "Date     10/15/2024     10/07/2024    POTASSIUM 3.5 10/15/2024    POTASSIUM 3.9 10/07/2024    CHLORIDE 104 10/15/2024    CHLORIDE 105 10/07/2024    CO2 23 10/15/2024    CO2 23 10/07/2024    BUN 6.1 10/15/2024    BUN 6.9 10/07/2024    CR 0.61 10/15/2024    CR 0.56 10/07/2024    GLC 93 10/15/2024    GLC 96 10/07/2024     COAGS: No results found for: \"PTT\", \"INR\", \"FIBR\"  POC: No results found for: \"BGM\", \"HCG\", \"HCGS\"  HEPATIC:   Lab Results   Component Value Date    ALBUMIN 4.7 10/15/2024    PROTTOTAL 7.4 10/15/2024    ALT 13 10/15/2024    AST 17 10/15/2024    ALKPHOS 59 10/15/2024    BILITOTAL 0.4 10/15/2024     OTHER:   Lab Results   Component Value Date    AFUA 9.5 10/15/2024    TSH 1.95 10/15/2024       Anesthesia Plan    ASA Status:  2    NPO Status:  NPO Appropriate    Anesthesia Type: General.     - Airway: ETT   Induction: Intravenous, Propofol.   Maintenance: Balanced.        Consents    Anesthesia Plan(s) and associated risks, benefits, and realistic alternatives discussed. Questions answered and patient/representative(s) expressed understanding.     - Discussed: Risks, Benefits and Alternatives for BOTH SEDATION and the PROCEDURE were discussed     - Discussed with:  Patient, Other (See Comment) (sister, declined intrepreter)      - Extended Intubation/Ventilatory Support Discussed: No.      - Patient is DNR/DNI Status: No     Use of blood products discussed: No .     Postoperative Care    Pain management: IV analgesics, Oral pain medications, Multi-modal analgesia.   PONV prophylaxis: Ondansetron (or other 5HT-3), Dexamethasone or Solumedrol, Background Propofol Infusion     Comments:               Augusto Olivier MD    I have reviewed the pertinent notes and labs in the chart from the past 30 days and (re)examined the patient.  Any updates or changes from those notes are reflected in this note.                                 "

## 2024-10-21 ENCOUNTER — HOSPITAL ENCOUNTER (OUTPATIENT)
Facility: AMBULATORY SURGERY CENTER | Age: 24
Discharge: HOME | End: 2024-10-21
Attending: STUDENT IN AN ORGANIZED HEALTH CARE EDUCATION/TRAINING PROGRAM | Admitting: STUDENT IN AN ORGANIZED HEALTH CARE EDUCATION/TRAINING PROGRAM
Payer: COMMERCIAL

## 2024-10-21 ENCOUNTER — ANESTHESIA (OUTPATIENT)
Dept: SURGERY | Facility: AMBULATORY SURGERY CENTER | Age: 24
End: 2024-10-21
Payer: COMMERCIAL

## 2024-10-21 VITALS
DIASTOLIC BLOOD PRESSURE: 80 MMHG | BODY MASS INDEX: 20.88 KG/M2 | HEART RATE: 94 BPM | OXYGEN SATURATION: 96 % | WEIGHT: 110.6 LBS | HEIGHT: 61 IN | TEMPERATURE: 98.2 F | SYSTOLIC BLOOD PRESSURE: 115 MMHG | RESPIRATION RATE: 13 BRPM

## 2024-10-21 DIAGNOSIS — J33.9 NASAL POLYP: ICD-10-CM

## 2024-10-21 DIAGNOSIS — R43.8 REDUCED SENSE OF SMELL: ICD-10-CM

## 2024-10-21 DIAGNOSIS — G89.18 ACUTE POST-OPERATIVE PAIN: ICD-10-CM

## 2024-10-21 DIAGNOSIS — J32.4 CHRONIC PANSINUSITIS: Primary | ICD-10-CM

## 2024-10-21 DIAGNOSIS — R43.0 ANOSMIA: ICD-10-CM

## 2024-10-21 LAB
HCG UR QL: NEGATIVE
INTERNAL QC OK POCT: NORMAL
POCT KIT EXPIRATION DATE: NORMAL
POCT KIT LOT NUMBER: NORMAL

## 2024-10-21 PROCEDURE — 31276 NSL/SINS NDSC FRNT TISS RMVL: CPT | Mod: LT

## 2024-10-21 PROCEDURE — 61782 SCAN PROC CRANIAL EXTRA: CPT | Mod: GC | Performed by: STUDENT IN AN ORGANIZED HEALTH CARE EDUCATION/TRAINING PROGRAM

## 2024-10-21 PROCEDURE — 81025 URINE PREGNANCY TEST: CPT | Performed by: PATHOLOGY

## 2024-10-21 PROCEDURE — 31276 NSL/SINS NDSC FRNT TISS RMVL: CPT | Mod: 50 | Performed by: STUDENT IN AN ORGANIZED HEALTH CARE EDUCATION/TRAINING PROGRAM

## 2024-10-21 PROCEDURE — 31231 NASAL ENDOSCOPY DX: CPT | Performed by: NURSE ANESTHETIST, CERTIFIED REGISTERED

## 2024-10-21 PROCEDURE — 88305 TISSUE EXAM BY PATHOLOGIST: CPT | Mod: TC | Performed by: STUDENT IN AN ORGANIZED HEALTH CARE EDUCATION/TRAINING PROGRAM

## 2024-10-21 PROCEDURE — 31231 NASAL ENDOSCOPY DX: CPT | Performed by: STUDENT IN AN ORGANIZED HEALTH CARE EDUCATION/TRAINING PROGRAM

## 2024-10-21 PROCEDURE — 31256 EXPLORATION MAXILLARY SINUS: CPT | Mod: 50 | Performed by: STUDENT IN AN ORGANIZED HEALTH CARE EDUCATION/TRAINING PROGRAM

## 2024-10-21 PROCEDURE — 31256 EXPLORATION MAXILLARY SINUS: CPT | Mod: LT

## 2024-10-21 PROCEDURE — 88305 TISSUE EXAM BY PATHOLOGIST: CPT | Mod: 26 | Performed by: STUDENT IN AN ORGANIZED HEALTH CARE EDUCATION/TRAINING PROGRAM

## 2024-10-21 PROCEDURE — 31257 NSL/SINS NDSC TOT W/SPHENDT: CPT | Mod: 50 | Performed by: STUDENT IN AN ORGANIZED HEALTH CARE EDUCATION/TRAINING PROGRAM

## 2024-10-21 PROCEDURE — 88311 DECALCIFY TISSUE: CPT | Mod: 26 | Performed by: STUDENT IN AN ORGANIZED HEALTH CARE EDUCATION/TRAINING PROGRAM

## 2024-10-21 PROCEDURE — 31257 NSL/SINS NDSC TOT W/SPHENDT: CPT | Mod: RT

## 2024-10-21 RX ORDER — LORAZEPAM 2 MG/ML
.5-1 INJECTION INTRAMUSCULAR
Status: DISCONTINUED | OUTPATIENT
Start: 2024-10-21 | End: 2024-10-22 | Stop reason: HOSPADM

## 2024-10-21 RX ORDER — OXYCODONE HYDROCHLORIDE 5 MG/1
5 TABLET ORAL
Status: DISCONTINUED | OUTPATIENT
Start: 2024-10-21 | End: 2024-10-22 | Stop reason: HOSPADM

## 2024-10-21 RX ORDER — OXYCODONE HYDROCHLORIDE 5 MG/1
10 TABLET ORAL
Status: CANCELLED | OUTPATIENT
Start: 2024-10-21

## 2024-10-21 RX ORDER — FENTANYL CITRATE 50 UG/ML
25 INJECTION, SOLUTION INTRAMUSCULAR; INTRAVENOUS
Status: CANCELLED | OUTPATIENT
Start: 2024-10-21

## 2024-10-21 RX ORDER — DIPHENHYDRAMINE HCL 25 MG
25 CAPSULE ORAL EVERY 6 HOURS PRN
Status: DISCONTINUED | OUTPATIENT
Start: 2024-10-21 | End: 2024-10-22 | Stop reason: HOSPADM

## 2024-10-21 RX ORDER — DIPHENHYDRAMINE HYDROCHLORIDE 50 MG/ML
25 INJECTION INTRAMUSCULAR; INTRAVENOUS EVERY 6 HOURS PRN
Status: DISCONTINUED | OUTPATIENT
Start: 2024-10-21 | End: 2024-10-22 | Stop reason: HOSPADM

## 2024-10-21 RX ORDER — FENTANYL CITRATE 50 UG/ML
INJECTION, SOLUTION INTRAMUSCULAR; INTRAVENOUS PRN
Status: DISCONTINUED | OUTPATIENT
Start: 2024-10-21 | End: 2024-10-21

## 2024-10-21 RX ORDER — ALBUTEROL SULFATE 0.83 MG/ML
2.5 SOLUTION RESPIRATORY (INHALATION) EVERY 4 HOURS PRN
Status: DISCONTINUED | OUTPATIENT
Start: 2024-10-21 | End: 2024-10-22 | Stop reason: HOSPADM

## 2024-10-21 RX ORDER — NALOXONE HYDROCHLORIDE 0.4 MG/ML
0.1 INJECTION, SOLUTION INTRAMUSCULAR; INTRAVENOUS; SUBCUTANEOUS
Status: CANCELLED | OUTPATIENT
Start: 2024-10-21

## 2024-10-21 RX ORDER — TRIAMCINOLONE ACETONIDE 40 MG/ML
INJECTION, SUSPENSION INTRA-ARTICULAR; INTRAMUSCULAR PRN
Status: DISCONTINUED | OUTPATIENT
Start: 2024-10-21 | End: 2024-10-21 | Stop reason: HOSPADM

## 2024-10-21 RX ORDER — ACETAMINOPHEN 325 MG/1
975 TABLET ORAL ONCE
Status: DISCONTINUED | OUTPATIENT
Start: 2024-10-21 | End: 2024-10-22 | Stop reason: HOSPADM

## 2024-10-21 RX ORDER — MEPERIDINE HYDROCHLORIDE 25 MG/ML
12.5 INJECTION INTRAMUSCULAR; INTRAVENOUS; SUBCUTANEOUS EVERY 5 MIN PRN
Status: DISCONTINUED | OUTPATIENT
Start: 2024-10-21 | End: 2024-10-22 | Stop reason: HOSPADM

## 2024-10-21 RX ORDER — OXYMETAZOLINE HYDROCHLORIDE 0.05 G/100ML
SPRAY NASAL PRN
Status: DISCONTINUED | OUTPATIENT
Start: 2024-10-21 | End: 2024-10-21 | Stop reason: HOSPADM

## 2024-10-21 RX ORDER — LIDOCAINE HYDROCHLORIDE AND EPINEPHRINE 10; 10 MG/ML; UG/ML
INJECTION, SOLUTION INFILTRATION; PERINEURAL PRN
Status: DISCONTINUED | OUTPATIENT
Start: 2024-10-21 | End: 2024-10-21 | Stop reason: HOSPADM

## 2024-10-21 RX ORDER — DEXAMETHASONE SODIUM PHOSPHATE 10 MG/ML
4 INJECTION, SOLUTION INTRAMUSCULAR; INTRAVENOUS
Status: CANCELLED | OUTPATIENT
Start: 2024-10-21

## 2024-10-21 RX ORDER — LIDOCAINE 40 MG/G
CREAM TOPICAL
Status: DISCONTINUED | OUTPATIENT
Start: 2024-10-21 | End: 2024-10-22 | Stop reason: HOSPADM

## 2024-10-21 RX ORDER — ONDANSETRON 4 MG/1
4 TABLET, ORALLY DISINTEGRATING ORAL EVERY 30 MIN PRN
Status: DISCONTINUED | OUTPATIENT
Start: 2024-10-21 | End: 2024-10-22 | Stop reason: HOSPADM

## 2024-10-21 RX ORDER — CEFAZOLIN SODIUM 2 G/50ML
2 SOLUTION INTRAVENOUS SEE ADMIN INSTRUCTIONS
Status: DISCONTINUED | OUTPATIENT
Start: 2024-10-21 | End: 2024-10-22 | Stop reason: HOSPADM

## 2024-10-21 RX ORDER — DEXAMETHASONE SODIUM PHOSPHATE 10 MG/ML
4 INJECTION, SOLUTION INTRAMUSCULAR; INTRAVENOUS
Status: DISCONTINUED | OUTPATIENT
Start: 2024-10-21 | End: 2024-10-22 | Stop reason: HOSPADM

## 2024-10-21 RX ORDER — KETOROLAC TROMETHAMINE 30 MG/ML
15 INJECTION, SOLUTION INTRAMUSCULAR; INTRAVENOUS
Status: DISCONTINUED | OUTPATIENT
Start: 2024-10-21 | End: 2024-10-22 | Stop reason: HOSPADM

## 2024-10-21 RX ORDER — ONDANSETRON 2 MG/ML
4 INJECTION INTRAMUSCULAR; INTRAVENOUS EVERY 30 MIN PRN
Status: DISCONTINUED | OUTPATIENT
Start: 2024-10-21 | End: 2024-10-22 | Stop reason: HOSPADM

## 2024-10-21 RX ORDER — SODIUM CHLORIDE, SODIUM LACTATE, POTASSIUM CHLORIDE, CALCIUM CHLORIDE 600; 310; 30; 20 MG/100ML; MG/100ML; MG/100ML; MG/100ML
INJECTION, SOLUTION INTRAVENOUS CONTINUOUS
Status: DISCONTINUED | OUTPATIENT
Start: 2024-10-21 | End: 2024-10-22 | Stop reason: HOSPADM

## 2024-10-21 RX ORDER — FENTANYL CITRATE 50 UG/ML
25 INJECTION, SOLUTION INTRAMUSCULAR; INTRAVENOUS EVERY 5 MIN PRN
Status: DISCONTINUED | OUTPATIENT
Start: 2024-10-21 | End: 2024-10-22 | Stop reason: HOSPADM

## 2024-10-21 RX ORDER — OXYCODONE HYDROCHLORIDE 5 MG/1
5 TABLET ORAL EVERY 6 HOURS PRN
Qty: 12 TABLET | Refills: 0 | Status: SHIPPED | OUTPATIENT
Start: 2024-10-21 | End: 2024-10-24

## 2024-10-21 RX ORDER — ONDANSETRON 2 MG/ML
4 INJECTION INTRAMUSCULAR; INTRAVENOUS EVERY 30 MIN PRN
Status: CANCELLED | OUTPATIENT
Start: 2024-10-21

## 2024-10-21 RX ORDER — HYDRALAZINE HYDROCHLORIDE 20 MG/ML
2.5-5 INJECTION INTRAMUSCULAR; INTRAVENOUS EVERY 10 MIN PRN
Status: DISCONTINUED | OUTPATIENT
Start: 2024-10-21 | End: 2024-10-22 | Stop reason: HOSPADM

## 2024-10-21 RX ORDER — HYDROMORPHONE HYDROCHLORIDE 1 MG/ML
0.2 INJECTION, SOLUTION INTRAMUSCULAR; INTRAVENOUS; SUBCUTANEOUS EVERY 5 MIN PRN
Status: DISCONTINUED | OUTPATIENT
Start: 2024-10-21 | End: 2024-10-22 | Stop reason: HOSPADM

## 2024-10-21 RX ORDER — METHYLPREDNISOLONE 4 MG/1
TABLET ORAL
Qty: 21 TABLET | Refills: 0 | Status: SHIPPED | OUTPATIENT
Start: 2024-10-21

## 2024-10-21 RX ORDER — DEXAMETHASONE SODIUM PHOSPHATE 10 MG/ML
10 INJECTION, SOLUTION INTRAMUSCULAR; INTRAVENOUS ONCE
Status: COMPLETED | OUTPATIENT
Start: 2024-10-21 | End: 2024-10-21

## 2024-10-21 RX ORDER — ONDANSETRON 2 MG/ML
INJECTION INTRAMUSCULAR; INTRAVENOUS PRN
Status: DISCONTINUED | OUTPATIENT
Start: 2024-10-21 | End: 2024-10-21

## 2024-10-21 RX ORDER — FENTANYL CITRATE 50 UG/ML
50 INJECTION, SOLUTION INTRAMUSCULAR; INTRAVENOUS EVERY 5 MIN PRN
Status: DISCONTINUED | OUTPATIENT
Start: 2024-10-21 | End: 2024-10-22 | Stop reason: HOSPADM

## 2024-10-21 RX ORDER — HYDROMORPHONE HYDROCHLORIDE 1 MG/ML
0.4 INJECTION, SOLUTION INTRAMUSCULAR; INTRAVENOUS; SUBCUTANEOUS EVERY 5 MIN PRN
Status: DISCONTINUED | OUTPATIENT
Start: 2024-10-21 | End: 2024-10-22 | Stop reason: HOSPADM

## 2024-10-21 RX ORDER — OXYCODONE HYDROCHLORIDE 5 MG/1
5 TABLET ORAL
Status: CANCELLED | OUTPATIENT
Start: 2024-10-21

## 2024-10-21 RX ORDER — CEFAZOLIN SODIUM 2 G/50ML
2 SOLUTION INTRAVENOUS
Status: COMPLETED | OUTPATIENT
Start: 2024-10-21 | End: 2024-10-21

## 2024-10-21 RX ORDER — LABETALOL HYDROCHLORIDE 5 MG/ML
10 INJECTION, SOLUTION INTRAVENOUS
Status: DISCONTINUED | OUTPATIENT
Start: 2024-10-21 | End: 2024-10-22 | Stop reason: HOSPADM

## 2024-10-21 RX ORDER — PROPOFOL 10 MG/ML
INJECTION, EMULSION INTRAVENOUS PRN
Status: DISCONTINUED | OUTPATIENT
Start: 2024-10-21 | End: 2024-10-21

## 2024-10-21 RX ORDER — NALOXONE HYDROCHLORIDE 0.4 MG/ML
0.1 INJECTION, SOLUTION INTRAMUSCULAR; INTRAVENOUS; SUBCUTANEOUS
Status: DISCONTINUED | OUTPATIENT
Start: 2024-10-21 | End: 2024-10-22 | Stop reason: HOSPADM

## 2024-10-21 RX ORDER — PROPOFOL 10 MG/ML
INJECTION, EMULSION INTRAVENOUS CONTINUOUS PRN
Status: DISCONTINUED | OUTPATIENT
Start: 2024-10-21 | End: 2024-10-21

## 2024-10-21 RX ORDER — LIDOCAINE HYDROCHLORIDE 20 MG/ML
INJECTION, SOLUTION INFILTRATION; PERINEURAL PRN
Status: DISCONTINUED | OUTPATIENT
Start: 2024-10-21 | End: 2024-10-21

## 2024-10-21 RX ORDER — ACETAMINOPHEN 325 MG/1
975 TABLET ORAL ONCE
Status: CANCELLED | OUTPATIENT
Start: 2024-10-21 | End: 2024-10-21

## 2024-10-21 RX ORDER — ONDANSETRON 4 MG/1
4 TABLET, ORALLY DISINTEGRATING ORAL EVERY 30 MIN PRN
Status: CANCELLED | OUTPATIENT
Start: 2024-10-21

## 2024-10-21 RX ADMIN — CEFAZOLIN SODIUM 2 G: 2 SOLUTION INTRAVENOUS at 07:25

## 2024-10-21 RX ADMIN — PROPOFOL 100 MCG/KG/MIN: 10 INJECTION, EMULSION INTRAVENOUS at 10:30

## 2024-10-21 RX ADMIN — Medication 30 MG: at 07:22

## 2024-10-21 RX ADMIN — Medication 0.5 MG: at 08:15

## 2024-10-21 RX ADMIN — Medication 20 MG: at 07:46

## 2024-10-21 RX ADMIN — FENTANYL CITRATE 50 MCG: 50 INJECTION, SOLUTION INTRAMUSCULAR; INTRAVENOUS at 07:21

## 2024-10-21 RX ADMIN — FENTANYL CITRATE 50 MCG: 50 INJECTION, SOLUTION INTRAMUSCULAR; INTRAVENOUS at 07:46

## 2024-10-21 RX ADMIN — SODIUM CHLORIDE, SODIUM LACTATE, POTASSIUM CHLORIDE, CALCIUM CHLORIDE: 600; 310; 30; 20 INJECTION, SOLUTION INTRAVENOUS at 07:14

## 2024-10-21 RX ADMIN — PROPOFOL 150 MG: 10 INJECTION, EMULSION INTRAVENOUS at 07:21

## 2024-10-21 RX ADMIN — ONDANSETRON 4 MG: 2 INJECTION INTRAMUSCULAR; INTRAVENOUS at 10:34

## 2024-10-21 RX ADMIN — PROPOFOL 150 MCG/KG/MIN: 10 INJECTION, EMULSION INTRAVENOUS at 08:20

## 2024-10-21 RX ADMIN — PROPOFOL 150 MCG/KG/MIN: 10 INJECTION, EMULSION INTRAVENOUS at 09:24

## 2024-10-21 RX ADMIN — DEXAMETHASONE SODIUM PHOSPHATE 10 MG: 10 INJECTION, SOLUTION INTRAMUSCULAR; INTRAVENOUS at 07:22

## 2024-10-21 RX ADMIN — FENTANYL CITRATE 25 MCG: 50 INJECTION, SOLUTION INTRAMUSCULAR; INTRAVENOUS at 11:36

## 2024-10-21 RX ADMIN — Medication 0.5 MG: at 10:30

## 2024-10-21 RX ADMIN — PROPOFOL 150 MCG/KG/MIN: 10 INJECTION, EMULSION INTRAVENOUS at 07:21

## 2024-10-21 RX ADMIN — LIDOCAINE HYDROCHLORIDE 100 MG: 20 INJECTION, SOLUTION INFILTRATION; PERINEURAL at 07:21

## 2024-10-21 NOTE — DISCHARGE INSTRUCTIONS
What to expect:    Following this surgery your nose will be sore and congested.  You will likely have bloody nasal drainage and may pass large blood clots - this is normal.  You have dissolvable packing tucked inside your nose to help reduce bleeding and to keep the sinuses open; this will be removed at your follow up appointment.     What to do:  You can use the prescription pain medication for pain control  Please start using the NeilMed Sinus rinse the night of surgery - fill the bottle to the dotted line with distilled water and empty one salt packet in the bottle.  Warm the bottle up in the microwave so the saline is warm but not hot or boiling.  Place the black nozzle in the front of the nose, tuck your chin down while standing over the sink, and slowly and gently squeeze.  This will push the saline up into your nose; it may come out the same side, the other side, or out of your mouth, all are fine.  The goal is to gently flush the nose out to help keep your nose moisturized and open.  Please use this at least twice a day; you may use it more frequently if it feels comforting/soothing and or if you are getting a lot of debris out with the rinses (some people will use it 6-7 times a day in the post-operative period).  Please take the prescribed steroid (methylprednisolone)  Please run a humidifier at the bedside overnight  Please do not place anything in the nose till you see Dr. Alonso back in the office  In case of a nose bleed please spray afrin in the nose (3-4 sprays on both sides) and gently hold pressure on the outside portion of the nose.  If bleeding persists or worsens please call the office for more advice.  If you don't have significant bleeding, you do not need to take the afrin.  Please refrain from heavy lifting or strenuous activity following surgery (no more than about a gallon of milk), though we encourage you to be up and walking (no bed rest).  It helps to sleep with the head of bed elevated  by about 30 degrees.  Please try and keep your head above your heart to minimize the amount of strain inside the nose.  Please follow these activity restrictions until you see Dr. Alonso back in the office for your post-operative visit.  You may return to regular activity and/or work earlier if we specifically discussed that it is safe prior to surgery.            [facility]  Home Care Following Anesthesia  For 24 hours after surgery:  Get plenty of rest.  A responsible adult must stay with you for at least 24 hours after you leave the surgery center.  Do not drive or use heavy equipment.  If you have weakness or tingling, don't drive or use heavy equipment until this feeling goes away.   Do not drink alcohol.   Avoid strenuous or risky activities.  Ask for help when climbing stairs.  You may feel lightheaded.  IF so, sit for a few minutes before standing.  Have someone help you get up.   If you have nausea (feel sick to your stomach): Drink only clear liquids such as apple juice, ginger ale, broth or 7-Up.  Rest may also help.  Be sure to drink enough fluids.  Move to a regular diet as you feel able.   You may have a slight fever.  Call the doctor if your fever is over 100 F (37.7 C) (taken under the tongue) or lasts longer than 24 hours.  You may have a dry mouth, a sore throat, muscle aches or trouble sleeping. These should go away after 24 hours.  Do not make important or legal decisions.   It is recommended to avoid smoking.   If you use hormonal birth control (such as the pill, patch, ring or implants):  You will need a second form of birth control for 7 days (condoms, a diaphragm or contraceptive foam).  While in the surgery center, you received a medicine called Sugammadex.  Hormonal birth control (such as the pill, patch, ring or implants) will not work as well for a week after taking this medicine.         Tips for taking pain medications  To get the best pain relief  possible, remember these points:  Take pain medications as directed, before pain becomes severe.  Pain medication can upset your stomach: taking it with food may help.  Constipation is a common side effect of pain medication. Drink plenty of  fluids.  Eat foods high in fiber. Take a stool softener if recommended by your doctor or pharmacist.  Do not drink alcohol, drive or operate machinery while taking pain medications.  Ask about other ways to control pain, such as with heat, ice or relaxation.    Tylenol/Acetaminophen Consumption    If you feel your pain relief is insufficient, you may take Tylenol/Acetaminophen in addition to your narcotic pain medication.   Be careful not to exceed 4,000 mg of Tylenol/Acetaminophen in a 24 hour period from all sources.  If you are taking extra strength Tylenol/acetaminophen (500 mg), the maximum dose is 8 tablets in 24 hours.  If you are taking regular strength acetaminophen (325 mg), the maximum dose is 12 tablets in 24 hours.    Call a doctor for any of the following:  Signs of infection (fever, growing tenderness at the surgery site, a large amount of drainage or bleeding, severe pain, foul-smelling drainage, redness, swelling).  It has been over 8 to 10 hours since surgery and you are still not able to urinate (pass water).  Headache for over 24 hours.  Numbness, tingling or weakness the day after surgery (if you had spinal anesthesia).  Signs of Covid-19 infection (temperature over 100 degrees, shortness of breath, cough, loss of taste/smell, generalized body aches, persistent headache, chills, sore throat, nausea/vomiting/diarrhea)  Your doctor is:  Dr. Steffen Alonso, ENT Otolaryngology: [phone]                    Or dial [phone] and ask for the resident on call for:  ENT Otolaryngology  For emergency care, call the:  [facility]:  [phone] (TTY for hearing impaired: [phone])

## 2024-10-21 NOTE — ANESTHESIA POSTPROCEDURE EVALUATION
Patient: Yanely Fiore    Procedure: Procedure(s):  Image guided bilateral maxillary antrostomy, total ethmoidectomy, sphenoidotomy, frontal sinusotomy       Anesthesia Type:  General    Note:  Disposition: Outpatient   Postop Pain Control: Uneventful            Sign Out: Well controlled pain   PONV: No   Neuro/Psych: Uneventful            Sign Out: Acceptable/Baseline neuro status   Airway/Respiratory: Uneventful            Sign Out: Acceptable/Baseline resp. status   CV/Hemodynamics: Uneventful            Sign Out: Acceptable CV status; No obvious hypovolemia; No obvious fluid overload   Other NRE:    DID A NON-ROUTINE EVENT OCCUR?     Event details/Postop Comments:  Slow to wake up    Called regarding eye pain...likely related to procedure and noted prior to procedure as well.             Last vitals:  Vitals Value Taken Time   /71 10/21/24 1200   Temp 36.7  C (98.1  F) 10/21/24 1200   Pulse 81 10/21/24 1204   Resp 13 10/21/24 1204   SpO2 96 % 10/21/24 1204   Vitals shown include unfiled device data.    Electronically Signed By: Augusto Olivier MD  October 21, 2024  12:48 PM

## 2024-10-21 NOTE — ANESTHESIA PROCEDURE NOTES
Airway       Patient location during procedure: OR       Procedure Start/Stop Times: 10/21/2024 7:25 AM  Staff -        CRNA: Jes Braswell APRN CRNA       Performed By: CRNA  Consent for Airway        Urgency: elective  Indications and Patient Condition       Indications for airway management: timur-procedural and airway protection       Induction type:intravenous       Mask difficulty assessment: 1 - vent by mask    Final Airway Details       Final airway type: endotracheal airway       Successful airway: ETT - single  Endotracheal Airway Details        ETT size (mm): 6.5       Cuffed: yes       Cuff volume (mL): 5       Successful intubation technique: direct laryngoscopy       DL Blade Type: Pittman 2       Grade View of Cords: 1       Adjucts: stylet       Position: Left       Measured from: gums/teeth       Secured at (cm): 20       Bite block used: None    Post intubation assessment        Placement verified by: capnometry, equal breath sounds and chest rise        Number of attempts at approach: 1       Secured with: tape       Ease of procedure: easy       Dentition: Intact    Medication(s) Administered   Medication Administration Time: 10/21/2024 7:25 AM

## 2024-10-21 NOTE — OP NOTE
[facility]  Department of Otolaryngology - Head & Neck Surgery  Division of Rhinology and Skull Base Surgery  Operative report     Procedure date: October 21, 2024     Preoperative diagnosis:  Chronic pansinusitis with nasal polyps     Postoperative diagnosis:  Same as preop     Procedures performed:  1.  Bilateral endoscopic maxillary antrostomy  2.  Bilateral endoscopic ethmoidectomy, total  3.  Bilateral endoscopic sphenoidotomy  4.  Bilateral endoscopic frontal sinusotomy, Draf 2a - the Draf 2a on the left took significantly longer (1 hour) due to a complex intrafrontal ethmoid cell  5.  Bilateral inferior turbinate outfracture  6.  Use of intraoperative image guidance    Findings:   Nasal polyps and polypoid mucosa throughout    Surgeon: Steffen Alonso MD      Assistants:  1.  Joe Valentine     Estimated blood loss: 100 ml     Anesthesia: General     Indications for procedure: The patient has history of nasal polyps with radiographic evidence of chronic pansinusitis that was refractory to maximal medical therapy.  Given this, surgery was recommended.  After a full discussion of the risks benefits and alternatives the patient was amenable with proceeding and informed consent was obtained.      Procedure in detail: The patient was identified in the preoperative holding area and informed consent was confirmed.  The patient was then brought to the operating room and placed supine on the operating room table.  A surgical time out was held.  Care of the patient was then turned over to the anesthesia team and she underwent induction of general anesthesia with endotracheal intubation.  The patient was padded appropriately.  The table was turned 180 degrees.  The intraoperative image guidance system was applied, calibrated, and accuracy was confirmed.  The patient was prepped and draped in the usual fashion.  The patient received antibiotics for prophylactics.  A timeout was performed confirm details of  the procedure.     Oxymetazoline was instilled in the nasal cavity for topical decongestion.  We began with 0 degree inspection of the nasal cavity.  A Boies elevator was used to outfracture the inferior turbinates bilaterally.  A freer elevator was used to medialize the middle turbinate bilaterally.  3 ml's of 1% lidocaine with 1:100K epinephrine was injected into the middle turbinate, superior turbinate, lateral nasal wall, and portions of the septum for additional decongestion.  Afrin soaked pledgets were placed for additional decongestion in the middle meatus.    We began on the right.  The free edge of the uncinate process was identified and medialized with a ball tip probe.  It was then resected with the backbiter and the microdebrider.  Small middle meatus polyps were debrided away; there was low grade polypoid mucosa throughout.  The natural os of the maxillary sinus was identified and cannulated with the ball probe and then gently dilated.  A tear drop shaped antrostomy was then created with through cutting instruments and the debrider.  The natural os of the ethmoid bulla was identified and a j-curette was used to resect the front face of the ethmoid bulla.  Through cutting instruments, debrider, and kerrisons were used to remove residual anterior ethmoid partitions.  The basal lamella was identified and an incised at the level of the roof of the maxillary sinus at the junction of the vertical and horizontal portions.  The inferior two thirds of the superior turbinate were resected with through cutting instruments.  The posterior ethmoid cells were entered and partitions were removed.  The sphenoid os was identified and cannulated.  Kerrisons were used to remove the sphenoid face while sparing the sphenoid face mucosa inferiorly.      The skull base was identified in the sphenoid and then progressively skeletonized from a posterior to anterior fashion in the ethmoid roof using angled endoscopes.  The  anterior ethmoid artery was identified and protected.  The agger nasi cell was identified and the cap was removed allowing entry into the frontal outflow tract.  The curved sinus instruments were used to remove additional bone of the outflow tract out to the orbit and medially to the middle turbinate.      We then turned our attention to the left. The free edge of the uncinate process was identified and medialized with a ball tip probe.  Here again there was polypoid degeneration with a few formal middle meatus polyps that were shaved away.  The uncinate was then resected with the backbiter and the microdebrider.  The natural os of the maxillary sinus was identified and cannulated with the ball probe and then gently dilated.  A tear drop shaped antrostomy was then created with through cutting instruments and the debrider.  The natural os of the ethmoid bulla was identified and a j-curette was used to resect the front face of the ethmoid bulla.  Through cutting instruments, debrider, and kerrisons were used to remove residual anterior ethmoid partitions.  The basal lamella was identified and an incised at the level of the roof of the maxillary sinus at the junction of the vertical and horizontal portions.  The inferior two thirds of the superior turbinate were resected with through cutting instruments.  The posterior ethmoid cells were entered and partitions were removed.  The sphenoid os was identified and cannulated.  Kerrisons were used to remove the sphenoid face while sparing the sphenoid face mucosa inferiorly.      The skull base was identified in the sphenoid and then progressively skeletonized from a posterior to anterior fashion in the ethmoid roof using angled endoscopes.  The anterior ethmoid artery was identified and protected.  The agger nasi cell was identified and taken down.  We then used 70 degree scope and frontal sinus instruments to inspect the area.  The agger cell was carefully dissected along  its medial surface and used to gain entry into a large supra bullar that had pneumatized into a majority of the frontal sinus proper.  This cell was dissected open and positioning was confirmed with image guidance.  We then carefully inspected the anterior and medial margins of this cell and identified a small mucosal cleft that in reality was the outflow tract of the actual frontal sinus.  We used through cutting angled instruments to dissect this cleft out allowing us to get anterior and superior to the suprabullar cell.  This allowed entry into the real frontal sinus which had significant mucosal and polypoid disease.  The cap of the suprabullar cell was dissected to the very apex and laterally over the orbit.     After hemostasis had been achieved and all bone chips were removed kenalog soaked posisep was tucked into the middle meatus to medialize the middle turbinates.  An orogastric tube was passed.  Care of the patient was then turned back over the anesthesia team.  The patient underwent an uneventful recovery from general anesthesia and was successfully extubated.  The patient was then transported to the postoperative care unit for additional care.      I was scrubbed for the entirety of the procedure, performed all the key portions, and directly supervised all resident participation.        Steffen Alonso MD

## 2024-10-21 NOTE — ANESTHESIA CARE TRANSFER NOTE
Patient: Yanely Fiore    Procedure: Procedure(s):  Image guided bilateral maxillary antrostomy, total ethmoidectomy, sphenoidotomy, frontal sinusotomy       Diagnosis: Chronic pansinusitis [J32.4]  Diagnosis Additional Information: No value filed.    Anesthesia Type:   General     Note:    Oropharynx: oropharynx clear of all foreign objects and spontaneously breathing  Level of Consciousness: awake  Oxygen Supplementation: face mask  Level of Supplemental Oxygen (L/min / FiO2): 6  Independent Airway: airway patency satisfactory and stable  Dentition: dentition unchanged  Vital Signs Stable: post-procedure vital signs reviewed and stable  Report to RN Given: handoff report given  Patient transferred to: PACU    Handoff Report: Identifed the Patient, Identified the Reponsible Provider, Reviewed the pertinent medical history, Discussed the surgical course, Reviewed Intra-OP anesthesia mangement and issues during anesthesia, Set expectations for post-procedure period and Allowed opportunity for questions and acknowledgement of understanding  Vitals:  Vitals Value Taken Time   BP     Temp     Pulse     Resp     SpO2         Electronically Signed By: NAZ Chavarria CRNA  October 21, 2024  11:03 AM

## 2024-10-23 ENCOUNTER — MYC MEDICAL ADVICE (OUTPATIENT)
Dept: OTOLARYNGOLOGY | Facility: CLINIC | Age: 24
End: 2024-10-23
Payer: COMMERCIAL

## 2024-10-23 ENCOUNTER — DOCUMENTATION ONLY (OUTPATIENT)
Dept: OTOLARYNGOLOGY | Facility: CLINIC | Age: 24
End: 2024-10-23
Payer: COMMERCIAL

## 2024-10-25 ENCOUNTER — OFFICE VISIT (OUTPATIENT)
Dept: OPHTHALMOLOGY | Facility: CLINIC | Age: 24
End: 2024-10-25
Attending: OPHTHALMOLOGY
Payer: COMMERCIAL

## 2024-10-25 DIAGNOSIS — H52.13 MYOPIA OF BOTH EYES: ICD-10-CM

## 2024-10-25 DIAGNOSIS — H57.11 PAIN AROUND RIGHT EYE: Primary | ICD-10-CM

## 2024-10-25 DIAGNOSIS — H04.123 DRY EYE SYNDROME OF BOTH EYES: ICD-10-CM

## 2024-10-25 DIAGNOSIS — H52.223 REGULAR ASTIGMATISM OF BOTH EYES: ICD-10-CM

## 2024-10-25 PROCEDURE — G0463 HOSPITAL OUTPT CLINIC VISIT: HCPCS

## 2024-10-25 PROCEDURE — 99213 OFFICE O/P EST LOW 20 MIN: CPT

## 2024-10-25 ASSESSMENT — VISUAL ACUITY
OS_CC: 20/20
OD_CC: 20/20
CORRECTION_TYPE: GLASSES
OD_CC+: -2
OS_CC+: -2
METHOD: SNELLEN - LINEAR

## 2024-10-25 ASSESSMENT — SLIT LAMP EXAM - LIDS
COMMENTS: NORMAL
COMMENTS: NORMAL

## 2024-10-25 ASSESSMENT — REFRACTION_WEARINGRX
OS_AXIS: 163
OS_SPHERE: -1.75
OS_CYLINDER: +1.00
OD_AXIS: 009
OD_SPHERE: -2.00
OD_CYLINDER: +1.25

## 2024-10-25 ASSESSMENT — CUP TO DISC RATIO
OD_RATIO: 0.1
OS_RATIO: 0.1

## 2024-10-25 ASSESSMENT — TONOMETRY
OD_IOP_MMHG: 16
OS_IOP_MMHG: 15
IOP_METHOD: TONOPEN

## 2024-10-25 ASSESSMENT — EXTERNAL EXAM - RIGHT EYE: OD_EXAM: NORMAL

## 2024-10-25 ASSESSMENT — EXTERNAL EXAM - LEFT EYE: OS_EXAM: NORMAL

## 2024-10-25 NOTE — NURSING NOTE
Chief Complaints and History of Present Illnesses   Patient presents with    Eye Pain Follow-Up     Chief Complaint(s) and History of Present Illness(es)       Eye Pain Follow-Up              Laterality: In right eye    Quality: pressure    Pain scale: 9/10    Frequency: constantly    Associated symptoms: discharge (dried tears when she wakes up in the AM in the right eye), photophobia and headaches    Treatments tried: artificial tears              Comments    Pt has pain in the right eye and is blurry. She's had pain for 1 year without any relief. Her eye also feels tired and heavy. It feels like someone is applying pressure to her eye. She also has a headache in the right side of her head all the time and has also had for a year.     She reports that she still has blurry vision with her new glasses. Had refraction here in July 2024.    She uses [facility] lubricant as needed.     Liz Awad OA 1:39 PM October 25, 2024

## 2024-10-25 NOTE — PROGRESS NOTES
History  HPI       Eye Pain Follow-Up      In right eye.  Characterized as pressure.  Pain was noted as 9/10.  Occurring constantly.  Associated symptoms include discharge (dried tears when she wakes up in the AM in the right eye), photophobia and headaches.  Treatments tried include artificial tears.             Comments    Pt has pain in the right eye and is blurry. She's had pain for 1 year without any relief. Her eye also feels tired and heavy. It feels like someone is applying pressure to her eye. She also has a headache in the right side of her head all the time and has also had for a year.     She reports that she still has blurry vision with her new glasses. Had refraction here in July 2024.    She uses [facility] lubricant as needed.     Liz Awad OA 1:39 PM October 25, 2024             Last edited by Liz Awad on 10/25/2024  1:39 PM.          Assessment/Plan  (H57.11) Pain around right eye  (primary encounter diagnosis)  Plan:   -Patient educated on today's findings.  -No Ocular Pathology detached to explain posterior pain and headache.  -Discussed talking to PCP on headache.   -Monitor at next visit.     (H04.123) Dry eye syndrome of both eyes  Plan:   -Start Artifical Tears QID or PRN each eye. Discussed Brands like Systane or Refresh.   -Monitor.      (H52.13) Myopia of both eyes, (H52.223) Regular astigmatism of both eyes  Plan:   -Discussed continued use of glasses to help improve headache and fatigue.  -Will review Rx at next visit.  -Monitor.     Return to clinic in 3-4 months for dry eye follow-up.    Complete documentation of historical and exam elements from today's encounter can be found in the full encounter summary report (not reduplicated in this progress note). I personally obtained the chief complaint(s) and history of present illness. I confirmed and edited as necessary the review of systems, past medical/surgical history, family history, social history, and examination  findings as document by others; and I examined the patient myself. I personally reviewed the relevant tests, images, and reports as documented above. I formulated and edited as necessary the assessment and plan and discussed the findings and management plan with the patient and family.    Michael Calzada OD

## 2024-10-28 LAB
PATH REPORT.COMMENTS IMP SPEC: NORMAL
PATH REPORT.COMMENTS IMP SPEC: NORMAL
PATH REPORT.FINAL DX SPEC: NORMAL
PATH REPORT.GROSS SPEC: NORMAL
PATH REPORT.MICROSCOPIC SPEC OTHER STN: NORMAL
PATH REPORT.RELEVANT HX SPEC: NORMAL
PHOTO IMAGE: NORMAL

## 2024-10-29 ENCOUNTER — OFFICE VISIT (OUTPATIENT)
Dept: OTOLARYNGOLOGY | Facility: CLINIC | Age: 24
End: 2024-10-29
Payer: COMMERCIAL

## 2024-10-29 VITALS
DIASTOLIC BLOOD PRESSURE: 91 MMHG | WEIGHT: 112.2 LBS | SYSTOLIC BLOOD PRESSURE: 129 MMHG | HEIGHT: 61 IN | BODY MASS INDEX: 21.18 KG/M2

## 2024-10-29 DIAGNOSIS — J32.4 CHRONIC PANSINUSITIS: Primary | ICD-10-CM

## 2024-10-29 DIAGNOSIS — J33.9 NASAL POLYP: ICD-10-CM

## 2024-10-29 DIAGNOSIS — R43.0 ANOSMIA: ICD-10-CM

## 2024-10-29 PROCEDURE — 99213 OFFICE O/P EST LOW 20 MIN: CPT | Mod: 25 | Performed by: STUDENT IN AN ORGANIZED HEALTH CARE EDUCATION/TRAINING PROGRAM

## 2024-10-29 PROCEDURE — 31237 NSL/SINS NDSC SURG BX POLYPC: CPT | Mod: 50 | Performed by: STUDENT IN AN ORGANIZED HEALTH CARE EDUCATION/TRAINING PROGRAM

## 2024-10-29 RX ORDER — BUDESONIDE 0.5 MG/2ML
INHALANT ORAL
Qty: 120 ML | Refills: 3 | Status: SHIPPED | OUTPATIENT
Start: 2024-10-29

## 2024-10-29 ASSESSMENT — PAIN SCALES - GENERAL: PAINLEVEL_OUTOF10: NO PAIN (0)

## 2024-10-29 NOTE — NURSING NOTE
"Chief Complaint   Patient presents with    RECHECK   Blood pressure (!) 129/91, height 1.549 m (5' 1\"), weight 50.9 kg (112 lb 3.2 oz), last menstrual period 09/05/2024, not currently breastfeeding. Jung Carcamo, EMT    "

## 2024-10-29 NOTE — PATIENT INSTRUCTIONS
You were seen in the ENT Clinic today by Dr. Alonso. If you have any questions or concerns after your appointment, please contact us (see below)    The following has been recommended for you based upon your appointment today:  Budesonide has been sent to the pharmacy     Please return to clinic in 1 month for follow up with Dr. Alonso     How to Contact Us:  Send a [employer] message to your provider. Our team will respond to you via [employer]. Occasionally, we will need to call you to get further information.  For urgent matters (Monday-Friday), call the ENT Clinic: [phone] and speak with a call center team member - they will route your call appropriately.   If you'd like to speak directly with a nurse, please find our contact information below. We do our best to check voicemail frequently throughout the day, and will work to call you back within 1-2 days. For urgent matters, please use the general clinic phone numbers listed above.    Kamille PRUITT RN, BSN   RN Care Coordinator, ENT Clinic  [facility]  Direct: [phone]  Shannan ANDERSON LPN  Direct: [phone]

## 2024-10-29 NOTE — LETTER
10/29/2024       RE: Yanely Fiore  [address] [facility] [address]     Dear Colleague,    Thank you for referring your patient, Yanely Fiore, to the [facility] E[facility]IC [place] [facility]. Please see a copy of my visit note below.      [facility] - Rhinology  Established Patient Visit      Encounter date:   10/29/2024    Assessment, Decision Making, and Plan:  (R43.0) Anosmia  (primary encounter diagnosis)  (J32.4) Chronic pansinusitis  (J33.9) Nasal polyp  Comment:   --s/p FESS   --packing debrided today, healing well, all sinuses widely patent  Plan:   --budesonide rinses twice a day  --follow up 1 month    Operative History:  10/21/24 (NRG)  Procedures performed:  1.  Bilateral endoscopic maxillary antrostomy  2.  Bilateral endoscopic ethmoidectomy, total  3.  Bilateral endoscopic sphenoidotomy  4.  Bilateral endoscopic frontal sinusotomy, Draf 2a - the Draf 2a on the left took significantly longer (1 hour) due to a complex intrafrontal ethmoid cell  5.  Bilateral inferior turbinate outfracture  6.  Use of intraoperative image guidance    Interval:  Doing well since surgery     History of Present Illness (from initial consultation):   Yanely Fiore is a 23 year old female who presents for consultation regarding smell loss.    Smell loss for 5 years  UPSIT today 7/40    At the time of onset, had runny nose, itching, sneezing, difficulty breathing through the nose  Taste is preserved    Still has congestion, runny nose, sneezing symptoms  Year round  Nasal sprays helped  No nasal surgery    Review of systems: A 14-point review of systems has been conducted and was negative for any notable symptoms, except as dictated in the history of present illness.     Medical History:  No past medical history on file.     Surgical History:   No past surgical history on file.     Family History:  No family history on file.  "    Social History:   Social History     Socioeconomic History     Marital status: Single   Tobacco Use     Smoking status: Never     Smokeless tobacco: Never   Substance and Sexual Activity     Alcohol use: Never     Drug use: Never     Social Determinants of Health     Financial Resource Strain: Not on File (2023)    Received from [facility]    Financial Resource Strain      Financial Resource Strain: 0   Food Insecurity: Not on File (2023)    Received from [facility]    Food Insecurity      Food: 0   Transportation Needs: Not on File (2023)    Received from [facility]    Transportation Needs      Transportation: 0   Physical Activity: Not on File (2023)    Received from [facility]    Physical Activity      Physical Activity: 0   Stress: Not on File (2023)    Received from [facility]    Stress      Stress: 0   Social Connections: Not on File (2023)    Received from [facility]    Social Connections      Social Connections and Isolation: 0   Housing Stability: Not on File (2023)    Received from [facility]    Housing Stability      Housin        Physical Exam:  BP (!) 129/91 (BP Location: Right arm, Patient Position: Sitting, Cuff Size: Adult Regular)   Ht 1.549 m (5' 1\")   Wt 50.9 kg (112 lb 3.2 oz)   LMP 2024 (Approximate)   BMI 21.20 kg/m        General Appearance: No acute distress, appropriate demeanor, conversant  Eyes: moist conjunctivae; EOMI; pupils symmetric; visual acuity grossly intact; no proptosis  Head: normocephalic; overall symmetric appearance without deformity  Face: overall symmetric without deformity  Ears: Normal appearance of external ear; external meatus normal in appearance; TMs intact without perforation bilaterally;   Nose: No external deformity; septum (midline) ; inferior turbinates (pale blue)   Oral Cavity/oropharynx: Normal appearance of mucosa; tongue midline; no mass or lesions; oropharynx without obvious mucosal " abnormality  Neck: no palpable lymphadenopathy; thyroid without palpable nodules  Lungs: symmetric chest rise; no wheezing  CV: Good distal perfusion; normal hear rate  Extremities: No deformity  Neurologic Exam: Cranial nerves II-XII are grossly intact; no focal deficit    Procedure Note  Procedure performed: Rigid nasal endoscopy, debridement  Indication: To evaluate for sinonasal pathology not visualized on routine anterior rhinoscopy  Anesthesia: 4% topical lidocaine with 0.05% oxymetazoline  Description of procedure: A 30 degree, 3 mm rigid endoscope was inserted into bilateral nasal cavities and the nasal valves, nasal cavity, middle meatus, sphenoethmoid recess, and nasopharynx were thoroughly evaluated for evidence of obstruction, edema, purulence, polyps and/or mass/lesion.     Findings:    Packing and crusting debrided from bilateral middle meatus  Frontal patent bilaterally    The patient tolerated the procedure well without complication.       Steffen Alonso MD    [facility]  Rhinology  Endoscopic Skull Base Surgery  [facility]  Department of Otolaryngology - Head & Neck Surgery      Again, thank you for allowing me to participate in the care of your patient.      Sincerely,    Steffen Alonso MD     Attending Resident

## 2024-10-30 NOTE — PROGRESS NOTES
[facility] - Rhinology  Established Patient Visit      Encounter date:   10/29/2024    Assessment, Decision Making, and Plan:  (R43.0) Anosmia  (primary encounter diagnosis)  (J32.4) Chronic pansinusitis  (J33.9) Nasal polyp  Comment:   --s/p FESS   --packing debrided today, healing well, all sinuses widely patent  Plan:   --budesonide rinses twice a day  --follow up 1 month    Operative History:  10/21/24 (NRG)  Procedures performed:  1.  Bilateral endoscopic maxillary antrostomy  2.  Bilateral endoscopic ethmoidectomy, total  3.  Bilateral endoscopic sphenoidotomy  4.  Bilateral endoscopic frontal sinusotomy, Draf 2a - the Draf 2a on the left took significantly longer (1 hour) due to a complex intrafrontal ethmoid cell  5.  Bilateral inferior turbinate outfracture  6.  Use of intraoperative image guidance    Interval:  Doing well since surgery     History of Present Illness (from initial consultation):   Yanely iFore is a 23 year old female who presents for consultation regarding smell loss.    Smell loss for 5 years  UPSIT today 7/40    At the time of onset, had runny nose, itching, sneezing, difficulty breathing through the nose  Taste is preserved    Still has congestion, runny nose, sneezing symptoms  Year round  Nasal sprays helped  No nasal surgery    Review of systems: A 14-point review of systems has been conducted and was negative for any notable symptoms, except as dictated in the history of present illness.     Medical History:  No past medical history on file.     Surgical History:   No past surgical history on file.     Family History:  No family history on file.     Social History:   Social History     Socioeconomic History    Marital status: Single   Tobacco Use    Smoking status: Never    Smokeless tobacco: Never   Substance and Sexual Activity    Alcohol use: Never    Drug use: Never     Social Determinants of Health     Financial Resource Strain: Not on File (11/28/2023)     "Received from [facility]    Financial Resource Strain     Financial Resource Strain: 0   Food Insecurity: Not on File (2023)    Received from [facility]    Food Insecurity     Food: 0   Transportation Needs: Not on File (2023)    Received from [facility]    Transportation Needs     Transportation: 0   Physical Activity: Not on File (2023)    Received from [facility]    Physical Activity     Physical Activity: 0   Stress: Not on File (2023)    Received from [facility]    Stress     Stress: 0   Social Connections: Not on File (2023)    Received from [facility]    Social Connections     Social Connections and Isolation: 0   Housing Stability: Not on File (2023)    Received from [facility]    Housing Stability     Housin        Physical Exam:  BP (!) 129/91 (BP Location: Right arm, Patient Position: Sitting, Cuff Size: Adult Regular)   Ht 1.549 m (5' 1\")   Wt 50.9 kg (112 lb 3.2 oz)   LMP 2024 (Approximate)   BMI 21.20 kg/m        General Appearance: No acute distress, appropriate demeanor, conversant  Eyes: moist conjunctivae; EOMI; pupils symmetric; visual acuity grossly intact; no proptosis  Head: normocephalic; overall symmetric appearance without deformity  Face: overall symmetric without deformity  Ears: Normal appearance of external ear; external meatus normal in appearance; TMs intact without perforation bilaterally;   Nose: No external deformity; septum (midline) ; inferior turbinates (pale blue)   Oral Cavity/oropharynx: Normal appearance of mucosa; tongue midline; no mass or lesions; oropharynx without obvious mucosal abnormality  Neck: no palpable lymphadenopathy; thyroid without palpable nodules  Lungs: symmetric chest rise; no wheezing  CV: Good distal perfusion; normal hear rate  Extremities: No deformity  Neurologic Exam: Cranial nerves II-XII are grossly intact; no focal deficit    Procedure Note  Procedure performed: Rigid nasal endoscopy, " debridement  Indication: To evaluate for sinonasal pathology not visualized on routine anterior rhinoscopy  Anesthesia: 4% topical lidocaine with 0.05% oxymetazoline  Description of procedure: A 30 degree, 3 mm rigid endoscope was inserted into bilateral nasal cavities and the nasal valves, nasal cavity, middle meatus, sphenoethmoid recess, and nasopharynx were thoroughly evaluated for evidence of obstruction, edema, purulence, polyps and/or mass/lesion.     Findings:    Packing and crusting debrided from bilateral middle meatus  Frontal patent bilaterally    The patient tolerated the procedure well without complication.       Steffen Alonso MD    [facility]  Rhinology  Endoscopic Skull Base Surgery  [facility]  Department of Otolaryngology - Head & Neck Surgery

## 2024-11-01 ENCOUNTER — MYC MEDICAL ADVICE (OUTPATIENT)
Dept: OTOLARYNGOLOGY | Facility: CLINIC | Age: 24
End: 2024-11-01
Payer: COMMERCIAL

## 2024-11-01 NOTE — TELEPHONE ENCOUNTER
Left Voicemail (1st Attempt) for the patient to call back and schedule the following:    Appointment type: Return ENT  Provider: Dr. Alonso  Return date: Tuesday 11/26 @ 9:40am or 10:00am  Specialty phone number: [phone]  Additional appointment(s) needed: No  Additonal Notes: 1 month follow up

## 2024-11-06 NOTE — TELEPHONE ENCOUNTER
Called patient with Marina  regarding changes in voice. Left voicemail with direct callback number.     Kamille Martell, RN, BSN  RN Care Coordinator, ENT Clinic

## 2024-11-08 NOTE — TELEPHONE ENCOUNTER
Called patient with Marina  and left voicemail with direct callback number.     Kamille Martell, RN, BSN  RN Care Coordinator, ENT Clinic

## 2024-11-11 ENCOUNTER — DOCUMENTATION ONLY (OUTPATIENT)
Dept: ALLERGY | Facility: CLINIC | Age: 24
End: 2024-11-11

## 2024-12-06 ENCOUNTER — OFFICE VISIT (OUTPATIENT)
Dept: OTOLARYNGOLOGY | Facility: CLINIC | Age: 24
End: 2024-12-06
Payer: COMMERCIAL

## 2024-12-06 DIAGNOSIS — J33.9 NASAL POLYP: ICD-10-CM

## 2024-12-06 DIAGNOSIS — R43.0 ANOSMIA: ICD-10-CM

## 2024-12-06 DIAGNOSIS — J32.4 CHRONIC PANSINUSITIS: Primary | ICD-10-CM

## 2024-12-06 PROCEDURE — 31231 NASAL ENDOSCOPY DX: CPT | Performed by: STUDENT IN AN ORGANIZED HEALTH CARE EDUCATION/TRAINING PROGRAM

## 2024-12-06 PROCEDURE — 99213 OFFICE O/P EST LOW 20 MIN: CPT | Mod: 25 | Performed by: STUDENT IN AN ORGANIZED HEALTH CARE EDUCATION/TRAINING PROGRAM

## 2024-12-06 ASSESSMENT — PAIN SCALES - GENERAL: PAINLEVEL_OUTOF10: NO PAIN (0)

## 2024-12-06 NOTE — LETTER
12/6/2024       RE: Yanely Fiore  [address] [address]     Dear Colleague,    Thank you for referring your patient, Yanely Fiore, to the [facility] EAR NOSE AND THROAT CLINIC [place] at [facility]. Please see a copy of my visit note below.      [facility] - Rhinology  Established Patient Visit      Encounter date:   12/6/24    Assessment, Decision Making, and Plan:  (R43.0) Anosmia  (primary encounter diagnosis)  (J32.4) Chronic pansinusitis  (J33.9) Nasal polyp  Comment:   --s/p FESS   --healing very well, all sinuses patent, no polyps  Plan:   --continue budesonide rinses twice a day  --follow up 3 months    Operative History:  10/21/24 (NRG)  Procedures performed:  1.  Bilateral endoscopic maxillary antrostomy  2.  Bilateral endoscopic ethmoidectomy, total  3.  Bilateral endoscopic sphenoidotomy  4.  Bilateral endoscopic frontal sinusotomy, Draf 2a - the Draf 2a on the left took significantly longer (1 hour) due to a complex intrafrontal ethmoid cell  5.  Bilateral inferior turbinate outfracture  6.  Use of intraoperative image guidance    Interval:  All preoperative symptoms are improved including breathing, smell, pressure    History of Present Illness (from initial consultation):   Yanely Fiore is a 23 year old female who presents for consultation regarding smell loss.    Smell loss for 5 years  UPSIT today 7/40    At the time of onset, had runny nose, itching, sneezing, difficulty breathing through the nose  Taste is preserved    Still has congestion, runny nose, sneezing symptoms  Year round  Nasal sprays helped  No nasal surgery    Review of systems: A 14-point review of systems has been conducted and was negative for any notable symptoms, except as dictated in the history of present illness.     Medical History:  No past medical history on file.     Surgical History:   No past surgical history on file.     Family  History:  No family history on file.     Social History:   Social History     Socioeconomic History     Marital status: Single   Tobacco Use     Smoking status: Never     Smokeless tobacco: Never   Substance and Sexual Activity     Alcohol use: Never     Drug use: Never     Social Determinants of Health     Financial Resource Strain: Not on File (2023)    [facility] from [facility]    Financial Resource Strain      Financial Resource Strain: 0   Food Insecurity: Not on File (2023)    Received from [facility]    Food Insecurity      Food: 0   Transportation Needs: Not on File (2023)    Received from [facility]    Transportation Needs      Transportation: 0   Physical Activity: Not on File (2023)    Received from [facility]    Physical Activity      Physical Activity: 0   Stress: Not on File (2023)    Received from [facility]    Stress      Stress: 0   Social Connections: Not on File (2023)    Received from [facility]    Social Connections      Social Connections and Isolation: 0   Housing Stability: Not on File (2023)    Received from [facility]    Housing Stability      Housin        Physical Exam:  LMP 2024 (Approximate)         General Appearance: No acute distress, appropriate demeanor, conversant  Eyes: moist conjunctivae; EOMI; pupils symmetric; visual acuity grossly intact; no proptosis  Head: normocephalic; overall symmetric appearance without deformity  Face: overall symmetric without deformity  Ears: Normal appearance of external ear; external meatus normal in appearance; TMs intact without perforation bilaterally;   Nose: No external deformity; septum (midline) ; inferior turbinates (pale blue)   Oral Cavity/oropharynx: Normal appearance of mucosa; tongue midline; no mass or lesions; oropharynx without obvious mucosal abnormality  Neck: no palpable lymphadenopathy; thyroid without palpable nodules  Lungs: symmetric chest rise; no wheezing  CV:  Good distal perfusion; normal hear rate  Extremities: No deformity  Neurologic Exam: Cranial nerves II-XII are grossly intact; no focal deficit    Procedure Note  Procedure performed: Rigid nasal endoscopy, debridement  Indication: To evaluate for sinonasal pathology not visualized on routine anterior rhinoscopy  Anesthesia: 4% topical lidocaine with 0.05% oxymetazoline  Description of procedure: A 30 degree, 3 mm rigid endoscope was inserted into bilateral nasal cavities and the nasal valves, nasal cavity, middle meatus, sphenoethmoid recess, and nasopharynx were thoroughly evaluated for evidence of obstruction, edema, purulence, polyps and/or mass/lesion.     Findings:    All sinuses patent  Septum midline  No polyps, no edema  Small focus of granulation on basal lamella at lateral attachment    The patient tolerated the procedure well without complication.       Steffen Alonso MD    [facility]  Rhinology  Endoscopic Skull Base Surgery  [facility]  Department of Otolaryngology - Head & Neck Surgery      Again, thank you for allowing me to participate in the care of your patient.      Sincerely,    Steffen Alonso MD

## 2024-12-06 NOTE — PATIENT INSTRUCTIONS
You were seen in the ENT Clinic today by Dr. Alonso. If you have any questions or concerns after your appointment, please contact us (see below)    The following has been recommended for you based upon your appointment today:  Continue steroid rinses     Please return to clinic in 3-4 months for follow up with Dr. Alonso     How to Contact Us:  Send a [employer] message to your provider. Our team will respond to you via [employer]. Occasionally, we will need to call you to get further information.  For urgent matters (Monday-Friday), call the ENT Clinic: [phone] and speak with a call center team member - they will route your call appropriately.   If you'd like to speak directly with a nurse, please find our contact information below. We do our best to check voicemail frequently throughout the day, and will work to call you back within 1-2 days. For urgent matters, please use the general clinic phone numbers listed above.    Kamille PRUITT RN, BSN   RN Care Coordinator, ENT Clinic  [facility]  Direct: [phone]  Shannan ANDERSON LPN  Direct: [phone]

## 2024-12-09 NOTE — PROGRESS NOTES
[facility] - Rhinology  Established Patient Visit      Encounter date:   12/6/24    Assessment, Decision Making, and Plan:  (R43.0) Anosmia  (primary encounter diagnosis)  (J32.4) Chronic pansinusitis  (J33.9) Nasal polyp  Comment:   --s/p FESS   --healing very well, all sinuses patent, no polyps  Plan:   --continue budesonide rinses twice a day  --follow up 3 months    Operative History:  10/21/24 (NRG)  Procedures performed:  1.  Bilateral endoscopic maxillary antrostomy  2.  Bilateral endoscopic ethmoidectomy, total  3.  Bilateral endoscopic sphenoidotomy  4.  Bilateral endoscopic frontal sinusotomy, Draf 2a - the Draf 2a on the left took significantly longer (1 hour) due to a complex intrafrontal ethmoid cell  5.  Bilateral inferior turbinate outfracture  6.  Use of intraoperative image guidance    Interval:  All preoperative symptoms are improved including breathing, smell, pressure    History of Present Illness (from initial consultation):   Yanely Fiore is a 23 year old female who presents for consultation regarding smell loss.    Smell loss for 5 years  UPSIT today 7/40    At the time of onset, had runny nose, itching, sneezing, difficulty breathing through the nose  Taste is preserved    Still has congestion, runny nose, sneezing symptoms  Year round  Nasal sprays helped  No nasal surgery    Review of systems: A 14-point review of systems has been conducted and was negative for any notable symptoms, except as dictated in the history of present illness.     Medical History:  No past medical history on file.     Surgical History:   No past surgical history on file.     Family History:  No family history on file.     Social History:   Social History     Socioeconomic History    Marital status: Single   Tobacco Use    Smoking status: Never    Smokeless tobacco: Never   Substance and Sexual Activity    Alcohol use: Never    Drug use: Never     Social Determinants of Health     Financial  Resource Strain: Not on File (2023)    Received from [facility]    Financial Resource Strain     Financial Resource Strain: 0   Food Insecurity: Not on File (2023)    Received from [facility]    Food Insecurity     Food: 0   Transportation Needs: Not on File (2023)    Received from [facility]    Transportation Needs     Transportation: 0   Physical Activity: Not on File (2023)    Received from [facility]    Physical Activity     Physical Activity: 0   Stress: Not on File (2023)    Received from [facility]    Stress     Stress: 0   Social Connections: Not on File (2023)    Received from [facility]    Social Connections     Social Connections and Isolation: 0   Housing Stability: Not on File (2023)    Received from [facility]    Housing Stability     Housin        Physical Exam:  LMP 2024 (Approximate)         General Appearance: No acute distress, appropriate demeanor, conversant  Eyes: moist conjunctivae; EOMI; pupils symmetric; visual acuity grossly intact; no proptosis  Head: normocephalic; overall symmetric appearance without deformity  Face: overall symmetric without deformity  Ears: Normal appearance of external ear; external meatus normal in appearance; TMs intact without perforation bilaterally;   Nose: No external deformity; septum (midline) ; inferior turbinates (pale blue)   Oral Cavity/oropharynx: Normal appearance of mucosa; tongue midline; no mass or lesions; oropharynx without obvious mucosal abnormality  Neck: no palpable lymphadenopathy; thyroid without palpable nodules  Lungs: symmetric chest rise; no wheezing  CV: Good distal perfusion; normal hear rate  Extremities: No deformity  Neurologic Exam: Cranial nerves II-XII are grossly intact; no focal deficit    Procedure Note  Procedure performed: Rigid nasal endoscopy, debridement  Indication: To evaluate for sinonasal pathology not visualized on routine anterior  rhinoscopy  Anesthesia: 4% topical lidocaine with 0.05% oxymetazoline  Description of procedure: A 30 degree, 3 mm rigid endoscope was inserted into bilateral nasal cavities and the nasal valves, nasal cavity, middle meatus, sphenoethmoid recess, and nasopharynx were thoroughly evaluated for evidence of obstruction, edema, purulence, polyps and/or mass/lesion.     Findings:    All sinuses patent  Septum midline  No polyps, no edema  Small focus of granulation on basal lamella at lateral attachment    The patient tolerated the procedure well without complication.       Steffen Alonso MD    [facility]  Rhinology  Endoscopic Skull Base Surgery  [facility]  Department of Otolaryngology - Head & Neck Surgery

## 2024-12-15 ENCOUNTER — HEALTH MAINTENANCE LETTER (OUTPATIENT)
Age: 24
End: 2024-12-15

## 2025-02-04 ENCOUNTER — OFFICE VISIT (OUTPATIENT)
Dept: OTOLARYNGOLOGY | Facility: CLINIC | Age: 25
End: 2025-02-04
Payer: COMMERCIAL

## 2025-02-04 VITALS
WEIGHT: 113.4 LBS | SYSTOLIC BLOOD PRESSURE: 127 MMHG | DIASTOLIC BLOOD PRESSURE: 87 MMHG | BODY MASS INDEX: 21.41 KG/M2 | HEART RATE: 90 BPM | OXYGEN SATURATION: 99 % | HEIGHT: 61 IN

## 2025-02-04 DIAGNOSIS — J33.9 NASAL POLYP: ICD-10-CM

## 2025-02-04 DIAGNOSIS — R43.0 ANOSMIA: ICD-10-CM

## 2025-02-04 DIAGNOSIS — J32.4 CHRONIC PANSINUSITIS: Primary | ICD-10-CM

## 2025-02-04 PROCEDURE — 31231 NASAL ENDOSCOPY DX: CPT | Performed by: STUDENT IN AN ORGANIZED HEALTH CARE EDUCATION/TRAINING PROGRAM

## 2025-02-04 PROCEDURE — 99213 OFFICE O/P EST LOW 20 MIN: CPT | Mod: 25 | Performed by: STUDENT IN AN ORGANIZED HEALTH CARE EDUCATION/TRAINING PROGRAM

## 2025-02-04 ASSESSMENT — PAIN SCALES - GENERAL: PAINLEVEL_OUTOF10: NO PAIN (0)

## 2025-02-04 NOTE — NURSING NOTE
"Chief Complaint   Patient presents with    RECHECK   Blood pressure 127/87, pulse 90, height 1.549 m (5' 1\"), weight 51.4 kg (113 lb 6.4 oz), SpO2 99%, not currently breastfeeding. Jung Carcamo, EMT    "

## 2025-02-04 NOTE — PATIENT INSTRUCTIONS
You were seen in the ENT Clinic today by Dr. Alonso. If you have any questions or concerns after your appointment, please contact us (see below)    The following has been recommended for you based upon your appointment today:  Continue with budesonide rinses    Please return to clinic in 6-9 months    How to Contact Us:  Send a [employer] message to your provider. Our team will respond to you via [employer]. Occasionally, we will need to call you to get further information.  For urgent matters (Monday-Friday), call the ENT Clinic: [phone] and speak with a call center team member - they will route your call appropriately.   If you'd like to speak directly with a nurse, please find our contact information below. We do our best to check voicemail frequently throughout the day, and will work to call you back within 1-2 days. For urgent matters, please use the general clinic phone numbers listed above.    Kamille PRUITT RN, BSN   RN Care Coordinator, ENT Clinic  [facility]  Direct: [phone]  Shannan ANDERSON LPN  Direct: [phone]

## 2025-02-04 NOTE — PROGRESS NOTES
[facility] - Rhinology  Established Patient Visit      Encounter date:   2/4/2025    Assessment, Decision Making, and Plan:  (R43.0) Anosmia  (primary encounter diagnosis)  (J32.4) Chronic pansinusitis  (J33.9) Nasal polyp  Comment:   --s/p FESS   --healing very well, all sinuses patent, no polyps  --all preoperative symptoms improved  Plan:   --continue budesonide rinses twice a day  --follow up 6-9 months    Operative History:  10/21/24 (NRG)  Procedures performed:  1.  Bilateral endoscopic maxillary antrostomy  2.  Bilateral endoscopic ethmoidectomy, total  3.  Bilateral endoscopic sphenoidotomy  4.  Bilateral endoscopic frontal sinusotomy, Draf 2a - the Draf 2a on the left took significantly longer (1 hour) due to a complex intrafrontal ethmoid cell  5.  Bilateral inferior turbinate outfracture  6.  Use of intraoperative image guidance    Interval:  No issues    History of Present Illness (from initial consultation):   Yanely Fiore is a 23 year old female who presents for consultation regarding smell loss.    Smell loss for 5 years  UPSIT today 7/40    At the time of onset, had runny nose, itching, sneezing, difficulty breathing through the nose  Taste is preserved    Still has congestion, runny nose, sneezing symptoms  Year round  Nasal sprays helped  No nasal surgery    Review of systems: A 14-point review of systems has been conducted and was negative for any notable symptoms, except as dictated in the history of present illness.     Medical History:  No past medical history on file.     Surgical History:   No past surgical history on file.     Family History:  No family history on file.     Social History:   Social History     Socioeconomic History    Marital status: Single   Tobacco Use    Smoking status: Never    Smokeless tobacco: Never   Substance and Sexual Activity    Alcohol use: Never    Drug use: Never     Social Determinants of Health     Financial Resource Strain: Not on File  "(2023)    Received from [facility]    Financial Resource Strain     Financial Resource Strain: 0   Food Insecurity: Not on File (2023)    Received from [facility]    Food Insecurity     Food: 0   Transportation Needs: Not on File (2023)    Received from [facility]    Transportation Needs     Transportation: 0   Physical Activity: Not on File (2023)    Received from [facility]    Physical Activity     Physical Activity: 0   Stress: Not on File (2023)    Received from [facility]    Stress     Stress: 0   Social Connections: Not on File (2023)    Received from [facility]    Social Connections     Social Connections and Isolation: 0   Housing Stability: Not on File (2023)    Received from [facility]    Housing Stability     Housin        Physical Exam:  /87 (BP Location: Left arm, Patient Position: Sitting, Cuff Size: Adult Regular)   Pulse 90   Ht 1.549 m (5' 1\")   Wt 51.4 kg (113 lb 6.4 oz)   SpO2 99%   BMI 21.43 kg/m            General Appearance: No acute distress, appropriate demeanor, conversant  Eyes: moist conjunctivae; EOMI; pupils symmetric; visual acuity grossly intact; no proptosis  Head: normocephalic; overall symmetric appearance without deformity  Face: overall symmetric without deformity  Ears: Normal appearance of external ear; external meatus normal in appearance; TMs intact without perforation bilaterally;   Nose: No external deformity; septum (midline) ; inferior turbinates (pale blue)   Oral Cavity/oropharynx: Normal appearance of mucosa; tongue midline; no mass or lesions; oropharynx without obvious mucosal abnormality  Neck: no palpable lymphadenopathy; thyroid without palpable nodules  Lungs: symmetric chest rise; no wheezing  CV: Good distal perfusion; normal hear rate  Extremities: No deformity  Neurologic Exam: Cranial nerves II-XII are grossly intact; no focal deficit    Procedure Note  Procedure performed: Rigid nasal " endoscopy  Indication: To evaluate for sinonasal pathology not visualized on routine anterior rhinoscopy  Anesthesia: 4% topical lidocaine with 0.05% oxymetazoline  Description of procedure: A 30 degree, 3 mm rigid endoscope was inserted into bilateral nasal cavities and the nasal valves, nasal cavity, middle meatus, sphenoethmoid recess, and nasopharynx were thoroughly evaluated for evidence of obstruction, edema, purulence, polyps and/or mass/lesion.     Findings:    All sinuses patent  Septum midline  No polyps, no edema    The patient tolerated the procedure well without complication.       Steffen Alonso MD    [facility]  Rhinology  Endoscopic Skull Base Surgery  [facility]  Department of Otolaryngology - Head & Neck Surgery

## 2025-02-04 NOTE — LETTER
2/4/2025       RE: Yanely Fiore  [address] [facility] [address]     Dear Colleague,    Thank you for referring your patient, Yanely Fiore, to the [facility] EAR NOSE AND THROAT CLINIC [place] [facility]. Please see a copy of my visit note below.      [facility] - Rhinology  Established Patient Visit      Encounter date:   2/4/2025    Assessment, Decision Making, and Plan:  (R43.0) Anosmia  (primary encounter diagnosis)  (J32.4) Chronic pansinusitis  (J33.9) Nasal polyp  Comment:   --s/p FESS   --healing very well, all sinuses patent, no polyps  --all preoperative symptoms improved  Plan:   --continue budesonide rinses twice a day  --follow up 6-9 months    Operative History:  10/21/24 (NRG)  Procedures performed:  1.  Bilateral endoscopic maxillary antrostomy  2.  Bilateral endoscopic ethmoidectomy, total  3.  Bilateral endoscopic sphenoidotomy  4.  Bilateral endoscopic frontal sinusotomy, Draf 2a - the Draf 2a on the left took significantly longer (1 hour) due to a complex intrafrontal ethmoid cell  5.  Bilateral inferior turbinate outfracture  6.  Use of intraoperative image guidance    Interval:  No issues    History of Present Illness (from initial consultation):   Yanely Fiore is a 23 year old female who presents for consultation regarding smell loss.    Smell loss for 5 years  UPSIT today 7/40    At the time of onset, had runny nose, itching, sneezing, difficulty breathing through the nose  Taste is preserved    Still has congestion, runny nose, sneezing symptoms  Year round  Nasal sprays helped  No nasal surgery    Review of systems: A 14-point review of systems has been conducted and was negative for any notable symptoms, except as dictated in the history of present illness.     Medical History:  No past medical history on file.     Surgical History:   No past surgical history on file.     Family History:  No family  "history on file.     Social History:   Social History     Socioeconomic History     Marital status: Single   Tobacco Use     Smoking status: Never     Smokeless tobacco: Never   Substance and Sexual Activity     Alcohol use: Never     Drug use: Never     Social Determinants of Health     Financial Resource Strain: Not on File (2023)    Received from [facility]    Financial Resource Strain      Financial Resource Strain: 0   Food Insecurity: Not on File (2023)    Received from [facility]    Food Insecurity      Food: 0   Transportation Needs: Not on File (2023)    Received from [facility]    Transportation Needs      Transportation: 0   Physical Activity: Not on File (2023)    Received from [facility]    Physical Activity      Physical Activity: 0   Stress: Not on File (2023)    Received from [facility]    Stress      Stress: 0   Social Connections: Not on File (2023)    Received from [facility]    Social Connections      Social Connections and Isolation: 0   Housing Stability: Not on File (2023)    Received from [facility]    Housing Stability      Housin        Physical Exam:  /87 (BP Location: Left arm, Patient Position: Sitting, Cuff Size: Adult Regular)   Pulse 90   Ht 1.549 m (5' 1\")   Wt 51.4 kg (113 lb 6.4 oz)   SpO2 99%   BMI 21.43 kg/m            General Appearance: No acute distress, appropriate demeanor, conversant  Eyes: moist conjunctivae; EOMI; pupils symmetric; visual acuity grossly intact; no proptosis  Head: normocephalic; overall symmetric appearance without deformity  Face: overall symmetric without deformity  Ears: Normal appearance of external ear; external meatus normal in appearance; TMs intact without perforation bilaterally;   Nose: No external deformity; septum (midline) ; inferior turbinates (pale blue)   Oral Cavity/oropharynx: Normal appearance of mucosa; tongue midline; no mass or lesions; oropharynx without obvious " mucosal abnormality  Neck: no palpable lymphadenopathy; thyroid without palpable nodules  Lungs: symmetric chest rise; no wheezing  CV: Good distal perfusion; normal hear rate  Extremities: No deformity  Neurologic Exam: Cranial nerves II-XII are grossly intact; no focal deficit    Procedure Note  Procedure performed: Rigid nasal endoscopy  Indication: To evaluate for sinonasal pathology not visualized on routine anterior rhinoscopy  Anesthesia: 4% topical lidocaine with 0.05% oxymetazoline  Description of procedure: A 30 degree, 3 mm rigid endoscope was inserted into bilateral nasal cavities and the nasal valves, nasal cavity, middle meatus, sphenoethmoid recess, and nasopharynx were thoroughly evaluated for evidence of obstruction, edema, purulence, polyps and/or mass/lesion.     Findings:    All sinuses patent  Septum midline  No polyps, no edema    The patient tolerated the procedure well without complication.       Steffen Alonso MD    [facility]  Rhinology  Endoscopic Skull Base Surgery  [facility]  Department of Otolaryngology - Head & Neck Surgery      Again, thank you for allowing me to participate in the care of your patient.      Sincerely,    Steffen Alonso MD

## 2025-02-25 ENCOUNTER — LAB REQUISITION (OUTPATIENT)
Dept: LAB | Facility: CLINIC | Age: 25
End: 2025-02-25
Payer: COMMERCIAL

## 2025-02-25 DIAGNOSIS — Z11.3 ENCOUNTER FOR SCREENING FOR INFECTIONS WITH A PREDOMINANTLY SEXUAL MODE OF TRANSMISSION: ICD-10-CM

## 2025-02-25 DIAGNOSIS — R30.9 PAINFUL MICTURITION, UNSPECIFIED: ICD-10-CM

## 2025-02-25 PROCEDURE — 87563 M. GENITALIUM AMP PROBE: CPT | Mod: ORL

## 2025-02-25 PROCEDURE — 87491 CHLMYD TRACH DNA AMP PROBE: CPT | Mod: ORL

## 2025-02-25 PROCEDURE — 87591 N.GONORRHOEAE DNA AMP PROB: CPT | Mod: ORL

## 2025-02-26 LAB
C TRACH DNA SPEC QL NAA+PROBE: NEGATIVE
N GONORRHOEA DNA SPEC QL NAA+PROBE: NEGATIVE
SPECIMEN TYPE: NORMAL
SPECIMEN TYPE: NORMAL

## 2025-02-28 LAB
M GENITALIUM DNA SPEC QL NAA+PROBE: NOT DETECTED
M HOMINIS DNA SPEC QL NAA+PROBE: NOT DETECTED
U PARVUM DNA SPEC QL NAA+PROBE: NOT DETECTED
U UREALYTICUM DNA SPEC QL NAA+PROBE: NOT DETECTED

## 2025-03-06 ENCOUNTER — MEDICAL CORRESPONDENCE (OUTPATIENT)
Dept: HEALTH INFORMATION MANAGEMENT | Facility: CLINIC | Age: 25
End: 2025-03-06

## 2025-03-06 ENCOUNTER — LAB REQUISITION (OUTPATIENT)
Dept: LAB | Facility: CLINIC | Age: 25
End: 2025-03-06
Payer: COMMERCIAL

## 2025-03-06 DIAGNOSIS — L67.9 HAIR COLOR AND HAIR SHAFT ABNORMALITY, UNSPECIFIED: ICD-10-CM

## 2025-03-06 PROCEDURE — 84403 ASSAY OF TOTAL TESTOSTERONE: CPT | Mod: ORL

## 2025-03-06 PROCEDURE — 84146 ASSAY OF PROLACTIN: CPT | Mod: ORL

## 2025-03-07 LAB — PROLACTIN SERPL 3RD IS-MCNC: 16 NG/ML (ref 5–23)

## 2025-03-09 LAB — TESTOST SERPL-MCNC: 18 NG/DL (ref 8–60)

## 2025-03-10 ENCOUNTER — TRANSCRIBE ORDERS (OUTPATIENT)
Dept: OTHER | Age: 25
End: 2025-03-10

## 2025-03-10 DIAGNOSIS — R35.0 URINARY FREQUENCY: Primary | ICD-10-CM

## 2025-03-10 DIAGNOSIS — L67.9 ABNORMALITY OF HAIR GROWTH: ICD-10-CM

## 2025-03-26 ENCOUNTER — MYC REFILL (OUTPATIENT)
Dept: OTOLARYNGOLOGY | Facility: CLINIC | Age: 25
End: 2025-03-26
Payer: COMMERCIAL

## 2025-03-26 DIAGNOSIS — J32.4 CHRONIC PANSINUSITIS: ICD-10-CM

## 2025-03-26 DIAGNOSIS — J33.9 NASAL POLYP: ICD-10-CM

## 2025-03-26 DIAGNOSIS — R43.0 ANOSMIA: ICD-10-CM

## 2025-03-27 RX ORDER — BUDESONIDE 0.5 MG/2ML
INHALANT ORAL
Qty: 120 ML | Refills: 3 | Status: SHIPPED | OUTPATIENT
Start: 2025-03-27

## 2025-04-26 ENCOUNTER — MEDICAL CORRESPONDENCE (OUTPATIENT)
Dept: HEALTH INFORMATION MANAGEMENT | Facility: CLINIC | Age: 25
End: 2025-04-26
Payer: COMMERCIAL

## 2025-04-29 ENCOUNTER — TRANSCRIBE ORDERS (OUTPATIENT)
Dept: OTHER | Age: 25
End: 2025-04-29

## 2025-04-29 DIAGNOSIS — K59.01 SLOW TRANSIT CONSTIPATION: Primary | ICD-10-CM

## 2025-05-28 ENCOUNTER — OFFICE VISIT (OUTPATIENT)
Dept: GASTROENTEROLOGY | Facility: CLINIC | Age: 25
End: 2025-05-28
Attending: NURSE PRACTITIONER
Payer: COMMERCIAL

## 2025-05-28 VITALS
BODY MASS INDEX: 21.11 KG/M2 | DIASTOLIC BLOOD PRESSURE: 77 MMHG | HEART RATE: 93 BPM | OXYGEN SATURATION: 99 % | WEIGHT: 111.8 LBS | SYSTOLIC BLOOD PRESSURE: 110 MMHG | HEIGHT: 61 IN

## 2025-05-28 DIAGNOSIS — K62.5 BRBPR (BRIGHT RED BLOOD PER RECTUM): Primary | ICD-10-CM

## 2025-05-28 DIAGNOSIS — K59.01 SLOW TRANSIT CONSTIPATION: ICD-10-CM

## 2025-05-28 PROCEDURE — 99203 OFFICE O/P NEW LOW 30 MIN: CPT | Performed by: INTERNAL MEDICINE

## 2025-05-28 PROCEDURE — 1126F AMNT PAIN NOTED NONE PRSNT: CPT | Performed by: INTERNAL MEDICINE

## 2025-05-28 PROCEDURE — 3078F DIAST BP <80 MM HG: CPT | Performed by: INTERNAL MEDICINE

## 2025-05-28 PROCEDURE — 3074F SYST BP LT 130 MM HG: CPT | Performed by: INTERNAL MEDICINE

## 2025-05-28 RX ORDER — SENNOSIDES 8.6 MG
TABLET ORAL
COMMUNITY
Start: 2025-02-26

## 2025-05-28 RX ORDER — LANOLIN ALCOHOL/MO/W.PET/CERES
1 CREAM (GRAM) TOPICAL
COMMUNITY
Start: 2025-04-26

## 2025-05-28 ASSESSMENT — PAIN SCALES - GENERAL: PAINLEVEL_OUTOF10: NO PAIN (0)
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
## 2025-05-28 NOTE — NURSING NOTE
"Chief Complaint   Patient presents with    New Patient     New consult for slow transit constipation, occasional blood in stool.     She requests these members of her care team be copied on today's visit information:  PCP: No Ref-Primary, Physician    Referring Provider:  NAZ Montero CNP  [address] [place] [address]    Vitals:    05/28/25 0757   BP: 110/77   BP Location: Left arm   Patient Position: Sitting   Cuff Size: Adult Small   Pulse: 93   SpO2: 99%   Weight: 50.7 kg (111 lb 12.8 oz)   Height: 1.549 m (5' 1\")     Body mass index is 21.12 kg/m .    Medications were reconciled.        Yolanda Garcia CMA        "
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
## 2025-05-28 NOTE — PATIENT INSTRUCTIONS
It was a pleasure meeting with you today and discussing your healthcare plan. Below is a summary of what we covered:    I'm happy you are doing better. Continue the magnesium/senna. Let me know if the bleeding returns.       Please see below for any additional questions and scheduling guidelines.    Sign up for [facility]: [facility] patient portal serves as a secure platform for accessing your medical records from the [facility]. Additionally, [facility] facilitates easy, timely, and secure messaging with your care team. If you have not signed up, you may do so by using the provided code or calling [phone].    Coordinating your care after your visit:  There are multiple options for scheduling your follow-up care based on your provider's recommendation.    How do I schedule a follow-up clinic appointment:   After your appointment, you may receive scheduling assistance with the Clinic Coordinators by having a seat in the waiting room and a Clinic Coordinator will call you up to schedule.  Virtual visits or after you leave the clinic:  Your provider has placed a follow-up order in the [facility] portal for scheduling your return appointment. A member of the scheduling team will contact you to schedule.  [email or web address] Scheduling: Timely scheduling through [facility] is advised to ensure appointment availability.   Call to schedule: You may schedule your follow-up appointment(s) by calling [phone], option 1.    How do I schedule my endoscopy or colonoscopy procedure:  If a procedure, such as a colonoscopy or upper endoscopy was ordered by your provider, the scheduling team will contact you to schedule this procedure. Or you may choose to call to schedule at   [phone], option 2.  Please allow 20-30 minutes when scheduling a procedure.    How do I get my blood work done? To get your blood work done, you need to schedule a lab appointment at an [facility]. There are multiple ways to schedule:   At  the clinic: The Clinic Coordinator you meet after your visit can help you schedule a lab appointment.   [facility] scheduling: [facility] offers online lab scheduling at all [facility] laboratory locations.   Call to schedule: You can call [phone] to schedule your lab appointment.    How do I schedule my imaging study: To schedule imaging studies, such as CT scans, ultrasounds, MRIs, or X-rays, contact Imaging Services at [phone].    How do I schedule a referral to another doctor: If your provider recommended a referral to another specialist(s), the referral order was placed by your provider. You will receive a phone call to schedule this referral, or you may choose to call the number attached to the referral to self-schedule.    For Post-Visit Question(s):  For any inquiries following today's visit:  Please utilize [facility] messaging and allow 48 hours for reply or contact the Call Center during normal business hours at [phone], option 3.  For Emergent After-hours questions, contact the On-Call GI Fellow through the [facility]  at [phone].  In addition, you may contact your Nurse directly using the provided contact information.    Test Results:  Test results will be accessible via [facility] in compliance with the 21st Century Cures Act. This means that your results will be available to you at the same time as your provider. Often you may see your results before your provider does. Results are reviewed by staff within two weeks with communication follow-up. Results may be released in the patient portal prior to your care team review.    Prescription Refill(s):  Medication prescribed by your provider will be addressed during your visit. For future refills, please coordinate with your pharmacy. If you have not had a recent clinic visit or routine labs, for your safety, your provider may not be able to refill your prescription.

## 2025-06-16 ENCOUNTER — PATIENT OUTREACH (OUTPATIENT)
Dept: CARE COORDINATION | Facility: CLINIC | Age: 25
End: 2025-06-16
Payer: COMMERCIAL

## 2025-06-18 ENCOUNTER — PATIENT OUTREACH (OUTPATIENT)
Dept: CARE COORDINATION | Facility: CLINIC | Age: 25
End: 2025-06-18
Payer: COMMERCIAL

## 2025-08-18 ENCOUNTER — MYC REFILL (OUTPATIENT)
Dept: ALLERGY | Facility: CLINIC | Age: 25
End: 2025-08-18
Payer: COMMERCIAL

## 2025-08-18 DIAGNOSIS — J30.1 SEASONAL ALLERGIC RHINITIS DUE TO POLLEN: ICD-10-CM

## 2025-08-19 RX ORDER — CETIRIZINE HYDROCHLORIDE 10 MG/1
10 TABLET ORAL DAILY
Qty: 30 TABLET | Refills: 5 | Status: SHIPPED | OUTPATIENT
Start: 2025-08-19

## (undated) DEVICE — SPLINT INTRANASAL POSISEPX GEL SPONGE 9210584

## (undated) DEVICE — ENDO SHEATH STORZ SHARPSITE ENDOSCRUB 0DEG 4MM 1912000

## (undated) DEVICE — TUBING SUCTION 10'X3/16" N510

## (undated) DEVICE — SYR 30ML LL W/O NDL 302832

## (undated) DEVICE — BLADE QUADCUT ROTATABLE FUSION 4.3MMX13CM M4 1884380EM

## (undated) DEVICE — PACK ENT ENDOSCOPY CUSTOM ASC

## (undated) DEVICE — SOL NACL 0.9% IRRIG 500ML BOTTLE 2F7123

## (undated) DEVICE — SPONGE COTTONOID 2X1/2" 80-1406

## (undated) DEVICE — DRAPE WARMER 66X44" ORS-300

## (undated) DEVICE — SUCTION MANIFOLD NEPTUNE 2 SYS 1 PORT 702-025-000

## (undated) DEVICE — SPECIMEN TRAP STRYKER NEPTUNE IN-LINE 0700-050-000

## (undated) DEVICE — NDL 25GA 1.5" 305127

## (undated) DEVICE — ENDO SHEATH STORZ SHARPSITE ENDOSCRUB 30DEG 4MM 1912010

## (undated) DEVICE — SYR 10ML FINGER CONTROL W/O NDL 309695

## (undated) DEVICE — DRSG HOLDER NASAL DALE 600

## (undated) DEVICE — ESU GROUND PAD ADULT W/CORD E7507

## (undated) DEVICE — STPL SKIN 35W ROTATING HEAD PRW35

## (undated) DEVICE — STRAP KNEE/BODY 31143004

## (undated) DEVICE — TUBING IRRIGATOR STRAIGHTSHOT XPS 1895522

## (undated) DEVICE — ESU HOLSTER PLASTIC DISP E2400

## (undated) DEVICE — DRSG TELFA ISLAND 4X8" 7541

## (undated) DEVICE — GLOVE PROTEXIS MICRO 7.0  2D73PM70

## (undated) DEVICE — ESU SUCTION CAUTERY 10FR FOOT CONTROL E2505-10FR

## (undated) DEVICE — LINEN TOWEL PACK X5 5464

## (undated) DEVICE — SOL NACL 0.9% INJ 1000ML BAG 2B1324X

## (undated) RX ORDER — PROPOFOL 10 MG/ML
INJECTION, EMULSION INTRAVENOUS
Status: DISPENSED
Start: 2024-10-21

## (undated) RX ORDER — OXYMETAZOLINE HYDROCHLORIDE 0.05 G/100ML
SPRAY NASAL
Status: DISPENSED
Start: 2024-10-21

## (undated) RX ORDER — LIDOCAINE HYDROCHLORIDE AND EPINEPHRINE 10; 10 MG/ML; UG/ML
INJECTION, SOLUTION INFILTRATION; PERINEURAL
Status: DISPENSED
Start: 2024-10-21

## (undated) RX ORDER — CEFAZOLIN SODIUM 2 G/50ML
SOLUTION INTRAVENOUS
Status: DISPENSED
Start: 2024-10-21

## (undated) RX ORDER — TRIAMCINOLONE ACETONIDE 40 MG/ML
INJECTION, SUSPENSION INTRA-ARTICULAR; INTRAMUSCULAR
Status: DISPENSED
Start: 2024-10-21

## (undated) RX ORDER — FENTANYL CITRATE 50 UG/ML
INJECTION, SOLUTION INTRAMUSCULAR; INTRAVENOUS
Status: DISPENSED
Start: 2024-10-21

## (undated) RX ORDER — HYDROMORPHONE HYDROCHLORIDE 1 MG/ML
INJECTION, SOLUTION INTRAMUSCULAR; INTRAVENOUS; SUBCUTANEOUS
Status: DISPENSED
Start: 2024-10-21